# Patient Record
Sex: MALE | Race: WHITE | NOT HISPANIC OR LATINO | URBAN - METROPOLITAN AREA
[De-identification: names, ages, dates, MRNs, and addresses within clinical notes are randomized per-mention and may not be internally consistent; named-entity substitution may affect disease eponyms.]

---

## 2017-02-07 ENCOUNTER — HOSPITAL ENCOUNTER (OUTPATIENT)
Dept: CT IMAGING | Facility: CLINIC | Age: 82
Discharge: HOME | End: 2017-02-07
Attending: PREVENTIVE MEDICINE | Admitting: PREVENTIVE MEDICINE
Payer: MEDICARE

## 2017-02-07 DIAGNOSIS — Z00.6 RESEARCH EXAM: ICD-10-CM

## 2017-02-07 PROCEDURE — 71250 CT THORAX DX C-: CPT

## 2017-05-12 ENCOUNTER — RECORDS - HEALTHEAST (OUTPATIENT)
Dept: ADMINISTRATIVE | Facility: OTHER | Age: 82
End: 2017-05-12

## 2017-05-13 ENCOUNTER — HOSPITAL ENCOUNTER (OUTPATIENT)
Dept: CT IMAGING | Facility: HOSPITAL | Age: 82
Discharge: HOME | End: 2017-05-13
Attending: FAMILY MEDICINE

## 2017-05-13 DIAGNOSIS — S09.90XA CLOSED HEAD INJURY: ICD-10-CM

## 2017-05-13 ASSESSMENT — MIFFLIN-ST. JEOR: SCORE: 1518.96

## 2019-01-02 ENCOUNTER — HOSPITAL ENCOUNTER (OUTPATIENT)
Dept: ADMINISTRATIVE | Age: 84
Discharge: HOME | End: 2019-01-02

## 2019-03-22 ENCOUNTER — AMBULATORY - HEALTHEAST (OUTPATIENT)
Dept: PHYSICAL MEDICINE AND REHAB | Facility: CLINIC | Age: 84
End: 2019-03-22

## 2019-03-22 DIAGNOSIS — M48.061 SPINAL STENOSIS, LUMBAR REGION, WITHOUT NEUROGENIC CLAUDICATION: ICD-10-CM

## 2019-03-28 ENCOUNTER — HOSPITAL ENCOUNTER (OUTPATIENT)
Dept: PHYSICAL MEDICINE AND REHAB | Facility: CLINIC | Age: 84
Discharge: HOME | End: 2019-03-28
Attending: FAMILY MEDICINE

## 2019-03-28 DIAGNOSIS — M54.16 LEFT LUMBAR RADICULITIS: ICD-10-CM

## 2019-03-28 DIAGNOSIS — G89.29 CHRONIC BILATERAL LOW BACK PAIN WITH LEFT-SIDED SCIATICA: ICD-10-CM

## 2019-03-28 DIAGNOSIS — M54.42 CHRONIC BILATERAL LOW BACK PAIN WITH LEFT-SIDED SCIATICA: ICD-10-CM

## 2019-03-28 DIAGNOSIS — M51.369 DDD (DEGENERATIVE DISC DISEASE), LUMBAR: ICD-10-CM

## 2019-03-28 DIAGNOSIS — M48.061 LUMBAR FORAMINAL STENOSIS: ICD-10-CM

## 2019-03-28 DIAGNOSIS — M48.061 SPINAL STENOSIS OF LUMBAR REGION WITHOUT NEUROGENIC CLAUDICATION: ICD-10-CM

## 2019-03-28 RX ORDER — TAMSULOSIN HYDROCHLORIDE 0.4 MG/1
1 CAPSULE ORAL DAILY
Status: SHIPPED | COMMUNITY
Start: 2019-03-28

## 2019-03-28 RX ORDER — CHLORAL HYDRATE 500 MG
1 CAPSULE ORAL DAILY
Status: SHIPPED | COMMUNITY
Start: 2019-03-28

## 2019-03-28 RX ORDER — ERGOCALCIFEROL 1.25 MG/1
1 CAPSULE ORAL WEEKLY
Status: SHIPPED | COMMUNITY
Start: 2019-03-28

## 2019-03-28 RX ORDER — GLUCOSAMINE/CHONDROITIN/C/MANG 500-400 MG
2 CAPSULE ORAL DAILY
Status: SHIPPED | COMMUNITY
Start: 2019-03-28

## 2019-03-28 RX ORDER — DONEPEZIL HYDROCHLORIDE 10 MG/1
1 TABLET, FILM COATED ORAL 2 TIMES DAILY
Status: SHIPPED | COMMUNITY
Start: 2019-03-28

## 2019-03-29 ENCOUNTER — RECORDS - HEALTHEAST (OUTPATIENT)
Dept: ADMINISTRATIVE | Facility: OTHER | Age: 84
End: 2019-03-29

## 2019-04-03 ENCOUNTER — RECORDS - HEALTHEAST (OUTPATIENT)
Dept: ADMINISTRATIVE | Facility: OTHER | Age: 84
End: 2019-04-03

## 2019-04-11 ENCOUNTER — HOSPITAL ENCOUNTER (OUTPATIENT)
Dept: PHYSICAL MEDICINE AND REHAB | Facility: CLINIC | Age: 84
Discharge: HOME | End: 2019-04-11
Attending: PAIN MEDICINE

## 2019-04-11 DIAGNOSIS — M54.42 CHRONIC BILATERAL LOW BACK PAIN WITH LEFT-SIDED SCIATICA: ICD-10-CM

## 2019-04-11 DIAGNOSIS — G89.29 CHRONIC BILATERAL LOW BACK PAIN WITH LEFT-SIDED SCIATICA: ICD-10-CM

## 2019-04-11 DIAGNOSIS — M99.83 OTHER BIOMECHANICAL LESIONS OF LUMBAR REGION: ICD-10-CM

## 2019-04-11 DIAGNOSIS — M54.16 LEFT LUMBAR RADICULITIS: ICD-10-CM

## 2019-04-11 DIAGNOSIS — M48.061 LUMBAR FORAMINAL STENOSIS: ICD-10-CM

## 2019-04-25 ENCOUNTER — HOSPITAL ENCOUNTER (OUTPATIENT)
Dept: PHYSICAL MEDICINE AND REHAB | Facility: CLINIC | Age: 84
Discharge: HOME | End: 2019-04-25
Attending: NURSE PRACTITIONER

## 2019-04-25 DIAGNOSIS — G89.29 CHRONIC BILATERAL LOW BACK PAIN WITH LEFT-SIDED SCIATICA: ICD-10-CM

## 2019-04-25 DIAGNOSIS — M48.061 SPINAL STENOSIS OF LUMBAR REGION WITHOUT NEUROGENIC CLAUDICATION: ICD-10-CM

## 2019-04-25 DIAGNOSIS — M54.42 CHRONIC BILATERAL LOW BACK PAIN WITH LEFT-SIDED SCIATICA: ICD-10-CM

## 2019-04-25 DIAGNOSIS — M48.061 LUMBAR FORAMINAL STENOSIS: ICD-10-CM

## 2019-04-25 DIAGNOSIS — M54.16 LEFT LUMBAR RADICULITIS: ICD-10-CM

## 2019-04-25 DIAGNOSIS — M51.369 DDD (DEGENERATIVE DISC DISEASE), LUMBAR: ICD-10-CM

## 2019-05-15 ENCOUNTER — HOSPITAL ENCOUNTER (OUTPATIENT)
Dept: PHYSICAL MEDICINE AND REHAB | Facility: CLINIC | Age: 84
Discharge: HOME | End: 2019-05-15
Attending: PAIN MEDICINE

## 2019-05-15 DIAGNOSIS — M99.83 OTHER BIOMECHANICAL LESIONS OF LUMBAR REGION: ICD-10-CM

## 2019-05-15 DIAGNOSIS — M54.16 LEFT LUMBAR RADICULITIS: ICD-10-CM

## 2019-05-15 DIAGNOSIS — G89.29 CHRONIC BILATERAL LOW BACK PAIN WITH LEFT-SIDED SCIATICA: ICD-10-CM

## 2019-05-15 DIAGNOSIS — M54.42 CHRONIC BILATERAL LOW BACK PAIN WITH LEFT-SIDED SCIATICA: ICD-10-CM

## 2019-05-15 DIAGNOSIS — M48.061 LUMBAR FORAMINAL STENOSIS: ICD-10-CM

## 2019-05-29 ENCOUNTER — HOSPITAL ENCOUNTER (OUTPATIENT)
Dept: PHYSICAL MEDICINE AND REHAB | Facility: CLINIC | Age: 84
Discharge: HOME | End: 2019-05-29
Attending: NURSE PRACTITIONER

## 2019-05-29 DIAGNOSIS — M54.42 CHRONIC BILATERAL LOW BACK PAIN WITH LEFT-SIDED SCIATICA: ICD-10-CM

## 2019-05-29 DIAGNOSIS — M48.061 SPINAL STENOSIS OF LUMBAR REGION WITHOUT NEUROGENIC CLAUDICATION: ICD-10-CM

## 2019-05-29 DIAGNOSIS — G89.29 CHRONIC BILATERAL LOW BACK PAIN WITH LEFT-SIDED SCIATICA: ICD-10-CM

## 2019-05-29 DIAGNOSIS — M48.061 LUMBAR FORAMINAL STENOSIS: ICD-10-CM

## 2019-05-29 DIAGNOSIS — M54.16 LEFT LUMBAR RADICULITIS: ICD-10-CM

## 2019-05-29 DIAGNOSIS — M51.369 DDD (DEGENERATIVE DISC DISEASE), LUMBAR: ICD-10-CM

## 2019-07-22 ENCOUNTER — HOSPITAL ENCOUNTER (OUTPATIENT)
Dept: PHYSICAL MEDICINE AND REHAB | Facility: CLINIC | Age: 84
Discharge: HOME | End: 2019-07-22
Attending: PAIN MEDICINE

## 2019-07-22 DIAGNOSIS — M51.369 DDD (DEGENERATIVE DISC DISEASE), LUMBAR: ICD-10-CM

## 2019-07-22 DIAGNOSIS — M99.83 OTHER BIOMECHANICAL LESIONS OF LUMBAR REGION: ICD-10-CM

## 2019-07-22 DIAGNOSIS — M54.16 LEFT LUMBAR RADICULITIS: ICD-10-CM

## 2019-07-22 DIAGNOSIS — M48.061 LUMBAR FORAMINAL STENOSIS: ICD-10-CM

## 2019-07-22 DIAGNOSIS — M48.061 SPINAL STENOSIS OF LUMBAR REGION WITHOUT NEUROGENIC CLAUDICATION: ICD-10-CM

## 2019-07-22 RX ORDER — BUPRENORPHINE 5 UG/H
PATCH TRANSDERMAL
Refills: 0 | Status: SHIPPED | COMMUNITY
Start: 2019-07-08

## 2019-07-24 ENCOUNTER — HOSPITAL ENCOUNTER (OUTPATIENT)
Dept: RADIOLOGY | Facility: HOSPITAL | Age: 84
Discharge: HOME | End: 2019-07-24

## 2019-07-24 DIAGNOSIS — M99.83 OTHER BIOMECHANICAL LESIONS OF LUMBAR REGION: ICD-10-CM

## 2019-07-24 DIAGNOSIS — M48.061 SPINAL STENOSIS OF LUMBAR REGION WITHOUT NEUROGENIC CLAUDICATION: ICD-10-CM

## 2019-07-24 DIAGNOSIS — M48.061 LUMBAR FORAMINAL STENOSIS: ICD-10-CM

## 2019-07-24 DIAGNOSIS — M51.369 DDD (DEGENERATIVE DISC DISEASE), LUMBAR: ICD-10-CM

## 2019-07-24 DIAGNOSIS — M54.16 LEFT LUMBAR RADICULITIS: ICD-10-CM

## 2019-07-25 ENCOUNTER — COMMUNICATION - HEALTHEAST (OUTPATIENT)
Dept: PHYSICAL MEDICINE AND REHAB | Facility: CLINIC | Age: 84
End: 2019-07-25

## 2019-08-08 ENCOUNTER — HOSPITAL ENCOUNTER (OUTPATIENT)
Dept: MRI IMAGING | Facility: HOSPITAL | Age: 84
Discharge: HOME | End: 2019-08-08
Attending: PAIN MEDICINE

## 2019-08-08 LAB
CREAT BLD-MCNC: 0.8 MG/DL (ref 0.7–1.3)
GFR SERPL CREATININE-BSD FRML MDRD: >60 ML/MIN/1.73M2

## 2019-08-12 ENCOUNTER — COMMUNICATION - HEALTHEAST (OUTPATIENT)
Dept: PHYSICAL MEDICINE AND REHAB | Facility: CLINIC | Age: 84
End: 2019-08-12

## 2019-08-16 ENCOUNTER — HOSPITAL ENCOUNTER (OUTPATIENT)
Dept: PHYSICAL MEDICINE AND REHAB | Facility: CLINIC | Age: 84
Discharge: HOME | End: 2019-08-16
Attending: PAIN MEDICINE

## 2019-08-16 DIAGNOSIS — M54.16 LEFT LUMBAR RADICULITIS: ICD-10-CM

## 2019-08-16 DIAGNOSIS — M48.061 SPINAL STENOSIS OF LUMBAR REGION WITHOUT NEUROGENIC CLAUDICATION: ICD-10-CM

## 2019-08-16 DIAGNOSIS — G89.29 CHRONIC BILATERAL LOW BACK PAIN WITH LEFT-SIDED SCIATICA: ICD-10-CM

## 2019-08-16 DIAGNOSIS — M51.369 DDD (DEGENERATIVE DISC DISEASE), LUMBAR: ICD-10-CM

## 2019-08-16 DIAGNOSIS — M48.061 LUMBAR FORAMINAL STENOSIS: ICD-10-CM

## 2019-08-16 DIAGNOSIS — M54.42 CHRONIC BILATERAL LOW BACK PAIN WITH LEFT-SIDED SCIATICA: ICD-10-CM

## 2019-09-27 ENCOUNTER — HOSPITAL ENCOUNTER (OUTPATIENT)
Dept: PHYSICAL MEDICINE AND REHAB | Facility: CLINIC | Age: 84
Discharge: HOME | End: 2019-09-27
Attending: PAIN MEDICINE

## 2019-09-27 DIAGNOSIS — M54.16 LEFT LUMBAR RADICULITIS: ICD-10-CM

## 2019-09-27 DIAGNOSIS — M54.42 CHRONIC BILATERAL LOW BACK PAIN WITH LEFT-SIDED SCIATICA: ICD-10-CM

## 2019-09-27 DIAGNOSIS — G89.29 CHRONIC BILATERAL LOW BACK PAIN WITH LEFT-SIDED SCIATICA: ICD-10-CM

## 2019-09-27 DIAGNOSIS — M48.061 LUMBAR FORAMINAL STENOSIS: ICD-10-CM

## 2019-09-27 DIAGNOSIS — M51.369 DDD (DEGENERATIVE DISC DISEASE), LUMBAR: ICD-10-CM

## 2019-09-27 DIAGNOSIS — M48.061 SPINAL STENOSIS OF LUMBAR REGION WITHOUT NEUROGENIC CLAUDICATION: ICD-10-CM

## 2019-09-27 RX ORDER — MENTHOL AND METHYL SALICYLATE 10; 30 G/100G; G/100G
CREAM TOPICAL
Status: SHIPPED | COMMUNITY
Start: 2019-09-27

## 2019-10-09 ENCOUNTER — COMMUNICATION - HEALTHEAST (OUTPATIENT)
Dept: PHYSICAL MEDICINE AND REHAB | Facility: CLINIC | Age: 84
End: 2019-10-09

## 2019-10-09 DIAGNOSIS — M51.369 DDD (DEGENERATIVE DISC DISEASE), LUMBAR: ICD-10-CM

## 2019-10-09 DIAGNOSIS — M48.061 LUMBAR FORAMINAL STENOSIS: ICD-10-CM

## 2019-10-09 DIAGNOSIS — M54.16 LEFT LUMBAR RADICULITIS: ICD-10-CM

## 2019-10-09 DIAGNOSIS — M48.061 SPINAL STENOSIS OF LUMBAR REGION WITHOUT NEUROGENIC CLAUDICATION: ICD-10-CM

## 2019-10-23 ENCOUNTER — HOSPITAL ENCOUNTER (OUTPATIENT)
Dept: PALLIATIVE MEDICINE | Facility: OTHER | Age: 84
Discharge: HOME | End: 2019-10-23
Attending: PAIN MEDICINE

## 2019-10-23 DIAGNOSIS — G89.4 CHRONIC PAIN SYNDROME: ICD-10-CM

## 2019-10-23 DIAGNOSIS — G89.29 CHRONIC LEFT-SIDED LOW BACK PAIN WITH LEFT-SIDED SCIATICA: ICD-10-CM

## 2019-10-23 DIAGNOSIS — M54.42 CHRONIC LEFT-SIDED LOW BACK PAIN WITH LEFT-SIDED SCIATICA: ICD-10-CM

## 2019-12-02 ENCOUNTER — COMMUNICATION - HEALTHEAST (OUTPATIENT)
Dept: PHYSICAL MEDICINE AND REHAB | Facility: CLINIC | Age: 84
End: 2019-12-02

## 2019-12-02 DIAGNOSIS — M48.061 LUMBAR FORAMINAL STENOSIS: ICD-10-CM

## 2019-12-02 DIAGNOSIS — M48.061 SPINAL STENOSIS OF LUMBAR REGION WITHOUT NEUROGENIC CLAUDICATION: ICD-10-CM

## 2019-12-02 DIAGNOSIS — M54.16 LEFT LUMBAR RADICULITIS: ICD-10-CM

## 2019-12-02 DIAGNOSIS — M51.369 DDD (DEGENERATIVE DISC DISEASE), LUMBAR: ICD-10-CM

## 2019-12-18 ENCOUNTER — HOSPITAL ENCOUNTER (OUTPATIENT)
Dept: PALLIATIVE MEDICINE | Facility: OTHER | Age: 84
Discharge: HOME | End: 2019-12-18

## 2019-12-18 DIAGNOSIS — M54.42 CHRONIC LEFT-SIDED LOW BACK PAIN WITH LEFT-SIDED SCIATICA: ICD-10-CM

## 2019-12-18 DIAGNOSIS — G89.4 CHRONIC PAIN SYNDROME: ICD-10-CM

## 2019-12-18 DIAGNOSIS — G89.29 CHRONIC LEFT-SIDED LOW BACK PAIN WITH LEFT-SIDED SCIATICA: ICD-10-CM

## 2020-01-01 ENCOUNTER — HOSPITAL ENCOUNTER (OUTPATIENT)
Dept: PHYSICAL MEDICINE AND REHAB | Facility: CLINIC | Age: 85
Discharge: HOME | End: 2020-12-23
Attending: PAIN MEDICINE

## 2020-01-01 DIAGNOSIS — M51.369 DDD (DEGENERATIVE DISC DISEASE), LUMBAR: ICD-10-CM

## 2020-01-01 DIAGNOSIS — M48.061 SPINAL STENOSIS OF LUMBAR REGION WITHOUT NEUROGENIC CLAUDICATION: ICD-10-CM

## 2020-01-01 DIAGNOSIS — M48.061 LUMBAR FORAMINAL STENOSIS: ICD-10-CM

## 2020-01-01 DIAGNOSIS — M54.16 LEFT LUMBAR RADICULITIS: ICD-10-CM

## 2020-01-01 RX ORDER — GABAPENTIN 100 MG/1
CAPSULE ORAL
Qty: 270 CAPSULE | Refills: 2 | Status: SHIPPED | OUTPATIENT
Start: 2020-01-01

## 2020-01-02 ENCOUNTER — HOSPITAL ENCOUNTER (OUTPATIENT)
Dept: PALLIATIVE MEDICINE | Facility: OTHER | Age: 85
Discharge: HOME | End: 2020-01-02

## 2020-01-02 DIAGNOSIS — G89.4 CHRONIC PAIN SYNDROME: ICD-10-CM

## 2020-01-02 DIAGNOSIS — G89.29 CHRONIC LEFT-SIDED LOW BACK PAIN WITH LEFT-SIDED SCIATICA: ICD-10-CM

## 2020-01-02 DIAGNOSIS — M54.42 CHRONIC LEFT-SIDED LOW BACK PAIN WITH LEFT-SIDED SCIATICA: ICD-10-CM

## 2020-01-09 ENCOUNTER — HOSPITAL ENCOUNTER (OUTPATIENT)
Dept: PALLIATIVE MEDICINE | Facility: OTHER | Age: 85
Discharge: HOME | End: 2020-01-09

## 2020-01-09 DIAGNOSIS — G89.4 CHRONIC PAIN SYNDROME: ICD-10-CM

## 2020-01-09 DIAGNOSIS — G89.29 CHRONIC LEFT-SIDED LOW BACK PAIN WITH LEFT-SIDED SCIATICA: ICD-10-CM

## 2020-01-09 DIAGNOSIS — M54.42 CHRONIC LEFT-SIDED LOW BACK PAIN WITH LEFT-SIDED SCIATICA: ICD-10-CM

## 2020-01-16 ENCOUNTER — HOSPITAL ENCOUNTER (OUTPATIENT)
Dept: PALLIATIVE MEDICINE | Facility: OTHER | Age: 85
Discharge: HOME | End: 2020-01-16

## 2020-01-16 DIAGNOSIS — G89.29 CHRONIC LEFT-SIDED LOW BACK PAIN WITH LEFT-SIDED SCIATICA: ICD-10-CM

## 2020-01-16 DIAGNOSIS — G89.4 CHRONIC PAIN SYNDROME: ICD-10-CM

## 2020-01-16 DIAGNOSIS — M54.42 CHRONIC LEFT-SIDED LOW BACK PAIN WITH LEFT-SIDED SCIATICA: ICD-10-CM

## 2020-02-27 ENCOUNTER — COMMUNICATION - HEALTHEAST (OUTPATIENT)
Dept: PHYSICAL MEDICINE AND REHAB | Facility: CLINIC | Age: 85
End: 2020-02-27

## 2020-02-27 DIAGNOSIS — M54.16 LEFT LUMBAR RADICULITIS: ICD-10-CM

## 2020-02-27 DIAGNOSIS — M48.061 LUMBAR FORAMINAL STENOSIS: ICD-10-CM

## 2020-02-27 DIAGNOSIS — M48.061 SPINAL STENOSIS OF LUMBAR REGION WITHOUT NEUROGENIC CLAUDICATION: ICD-10-CM

## 2020-02-27 DIAGNOSIS — M51.369 DDD (DEGENERATIVE DISC DISEASE), LUMBAR: ICD-10-CM

## 2021-01-01 ENCOUNTER — RECORDS - HEALTHEAST (OUTPATIENT)
Dept: ADMINISTRATIVE | Facility: OTHER | Age: 86
End: 2021-01-01

## 2021-01-01 ENCOUNTER — RECORDS - HEALTHEAST (OUTPATIENT)
Dept: LAB | Facility: CLINIC | Age: 86
End: 2021-01-01

## 2021-01-01 ENCOUNTER — COMMUNICATION - HEALTHEAST (OUTPATIENT)
Dept: PHYSICAL MEDICINE AND REHAB | Facility: CLINIC | Age: 86
End: 2021-01-01

## 2021-01-01 ENCOUNTER — RECORDS - HEALTHEAST (OUTPATIENT)
Dept: ADMINISTRATIVE | Facility: CLINIC | Age: 86
End: 2021-01-01

## 2021-01-01 ENCOUNTER — DOCUMENTATION ONLY (OUTPATIENT)
Dept: OTHER | Facility: CLINIC | Age: 86
End: 2021-01-01

## 2021-01-01 ENCOUNTER — HOSPITAL ENCOUNTER (OUTPATIENT)
Dept: RADIOLOGY | Facility: HOSPITAL | Age: 86
Discharge: HOME | End: 2021-02-03
Attending: FAMILY MEDICINE

## 2021-01-01 VITALS — BODY MASS INDEX: 28.87 KG/M2 | WEIGHT: 207 LBS

## 2021-01-01 VITALS — BODY MASS INDEX: 26.6 KG/M2 | HEIGHT: 71 IN | WEIGHT: 190 LBS

## 2021-01-01 VITALS — BODY MASS INDEX: 28.45 KG/M2 | WEIGHT: 204 LBS

## 2021-01-01 VITALS — WEIGHT: 205 LBS | BODY MASS INDEX: 28.59 KG/M2

## 2021-01-01 VITALS — WEIGHT: 204 LBS | BODY MASS INDEX: 28.45 KG/M2

## 2021-01-01 VITALS — WEIGHT: 189.6 LBS | BODY MASS INDEX: 26.44 KG/M2

## 2021-01-01 VITALS — WEIGHT: 207 LBS | BODY MASS INDEX: 28.87 KG/M2

## 2021-01-01 VITALS — BODY MASS INDEX: 26.71 KG/M2 | WEIGHT: 191.5 LBS

## 2021-01-01 DIAGNOSIS — M54.16 LEFT LUMBAR RADICULITIS: ICD-10-CM

## 2021-01-01 DIAGNOSIS — R13.12 OROPHARYNGEAL DYSPHAGIA: ICD-10-CM

## 2021-01-01 LAB
ANION GAP SERPL CALCULATED.3IONS-SCNC: 7 MMOL/L (ref 5–18)
ANION GAP SERPL CALCULATED.3IONS-SCNC: 9 MMOL/L (ref 5–18)
BASOPHILS # BLD AUTO: 0.2 THOU/UL (ref 0–0.2)
BASOPHILS NFR BLD AUTO: 2 % (ref 0–2)
BUN SERPL-MCNC: 14 MG/DL (ref 8–28)
BUN SERPL-MCNC: 23 MG/DL (ref 8–28)
CALCIUM SERPL-MCNC: 8.2 MG/DL (ref 8.5–10.5)
CALCIUM SERPL-MCNC: 8.6 MG/DL (ref 8.5–10.5)
CHLORIDE BLD-SCNC: 106 MMOL/L (ref 98–107)
CHLORIDE BLD-SCNC: 108 MMOL/L (ref 98–107)
CO2 SERPL-SCNC: 28 MMOL/L (ref 22–31)
CO2 SERPL-SCNC: 30 MMOL/L (ref 22–31)
CREAT SERPL-MCNC: 0.77 MG/DL (ref 0.7–1.3)
CREAT SERPL-MCNC: 0.99 MG/DL (ref 0.7–1.3)
EOSINOPHIL # BLD AUTO: 0.3 THOU/UL (ref 0–0.4)
EOSINOPHIL NFR BLD AUTO: 3 % (ref 0–6)
ERYTHROCYTE [DISTWIDTH] IN BLOOD BY AUTOMATED COUNT: 13.6 % (ref 11–14.5)
ERYTHROCYTE [DISTWIDTH] IN BLOOD BY AUTOMATED COUNT: 13.8 % (ref 11–14.5)
GFR SERPL CREATININE-BSD FRML MDRD: >60 ML/MIN/1.73M2
GFR SERPL CREATININE-BSD FRML MDRD: >60 ML/MIN/1.73M2
GLUCOSE BLD-MCNC: 102 MG/DL (ref 70–125)
GLUCOSE BLD-MCNC: 82 MG/DL (ref 70–125)
HCT VFR BLD AUTO: 35.1 % (ref 40–54)
HCT VFR BLD AUTO: 41.6 % (ref 40–54)
HGB BLD-MCNC: 11.4 G/DL (ref 14–18)
HGB BLD-MCNC: 13.3 G/DL (ref 14–18)
IMM GRANULOCYTES # BLD: 0.2 THOU/UL
IMM GRANULOCYTES NFR BLD: 3 %
LYMPHOCYTES # BLD AUTO: 1.5 THOU/UL (ref 0.8–4.4)
LYMPHOCYTES NFR BLD AUTO: 18 % (ref 20–40)
MCH RBC QN AUTO: 30.4 PG (ref 27–34)
MCH RBC QN AUTO: 30.9 PG (ref 27–34)
MCHC RBC AUTO-ENTMCNC: 32 G/DL (ref 32–36)
MCHC RBC AUTO-ENTMCNC: 32.5 G/DL (ref 32–36)
MCV RBC AUTO: 95 FL (ref 80–100)
MCV RBC AUTO: 95 FL (ref 80–100)
MONOCYTES # BLD AUTO: 0.7 THOU/UL (ref 0–0.9)
MONOCYTES NFR BLD AUTO: 8 % (ref 2–10)
NEUTROPHILS # BLD AUTO: 5.8 THOU/UL (ref 2–7.7)
NEUTROPHILS NFR BLD AUTO: 67 % (ref 50–70)
PLATELET # BLD AUTO: 188 THOU/UL (ref 140–440)
PLATELET # BLD AUTO: 240 THOU/UL (ref 140–440)
PMV BLD AUTO: 10.3 FL (ref 8.5–12.5)
PMV BLD AUTO: 10.5 FL (ref 8.5–12.5)
POTASSIUM BLD-SCNC: 3.8 MMOL/L (ref 3.5–5)
POTASSIUM BLD-SCNC: 3.9 MMOL/L (ref 3.5–5)
RBC # BLD AUTO: 3.69 MILL/UL (ref 4.4–6.2)
RBC # BLD AUTO: 4.38 MILL/UL (ref 4.4–6.2)
SODIUM SERPL-SCNC: 143 MMOL/L (ref 136–145)
SODIUM SERPL-SCNC: 145 MMOL/L (ref 136–145)
WBC: 10.4 THOU/UL (ref 4–11)
WBC: 8.6 THOU/UL (ref 4–11)

## 2021-01-01 RX ORDER — GABAPENTIN 250 MG/5ML
SOLUTION ORAL
Qty: 18 ML | Refills: 0 | Status: SHIPPED | OUTPATIENT
Start: 2021-01-01

## 2021-05-27 NOTE — PATIENT INSTRUCTIONS - HE
Follow-up visit in 2 weeks with Swati Valencia CNP to discuss injection outcome and determine care plan going forward.       DISCHARGE INSTRUCTIONS    During office hours (8:00 a.m.- 4:30 p.m.) questions or concerns may be answered  by calling Spine Navigation Nurses at  [phone].     If you experience any problems after hours  please call [phone] and you will be connected to [facility] Connection.     All Patients:    ? You may experience an increase in your symptoms for the first 2 days (It may take anywhere between 2 days- 2 weeks for the steroid to have maximum effect).    ? You may use ice on the injection site, as frequently as 20 minutes each hour if needed.    ? You may take your pain medicine.    ? You may continue taking your regular medication after your injection. If you have had a Medial Branch Block you may resume pain medication once your pain diary is completed.    ? You may shower. No swimming, tub bath or hot tub for 48 hours.  You may remove your bandaid/bandage as soon as you are home.    ? You may resume light activities, as tolerated.    ? Resume your usual diet as tolerated.    ? It is strongly advised that you do not drive for 1-3 hours post injection.    ? If you have had oral sedation:  Do not drive for 8 hours post injection.      ? If you have had IV sedation:  Do not drive for 24 hours post injection.  Do not operate hazardous machinery or make important personal/business decisions for 24 hours.      POSSIBLE STEROID SIDE EFFECTS (If steroid/cortisone was used for your procedure)    -If you experience these symptoms, it should only last for a short period      Swelling of the legs                Skin redness (flushing)       Mouth (oral) irritation     Blood sugar (glucose) levels              Sweats                      Mood changes    Headache    Sleeplessness         POSSIBLE PROCEDURE SIDE EFFECTS  -Call the Spine Center if you are concerned    Increased Pain              Increased numbness/tingling        Nausea/Vomiting            Bruising/bleeding at site        Redness or swelling                                                Difficulty walking        Weakness             Fever greater than 100.5    *In the event of a severe headache after an epidural steroid injection that is relieved by lying down, please call the [facility] to speak with a clinical staff member*

## 2021-05-27 NOTE — PATIENT INSTRUCTIONS - HE
Discussed the importance of core strengthening, ROM, stretching exercises with the patient and how each of these entities is important in decreasing pain.  Explained to the patient that the purpose of physical therapy is to teach the patient a home exercise program.  These exercises need to be performed every day in order to decrease pain and prevent future occurrences of pain.        ~Please call Nurse Navigation line [phone] with any questions or concerns about your treatment plan, if symptoms worsen and you would like to be seen urgently, or if you have problems controlling bladder and bowel function.  ~Follow Up Appointment time slots with Swati Valencia CNP with the Spine Center, are also available at the [facility] location near [facility] on the first and third THURSDAY afternoons of each month.

## 2021-05-27 NOTE — PROGRESS NOTES
ASSESSMENT: Chris Fleming is a 89 y.o. male who presents for consultation at the request of [facility] PCP Prashant Marc MD, with a past medical history significant for Alzheimer's disease-lives with wife, hypertension, knee replacement 2009, BPH, ED, lumbar stenosis who presents today for new patient evaluation of:    -Chronic low back pain with most bothersome left lumbar radiculitis specific to L5 dermatomal pattern ongoing since November 2018 with minimal relief with physical therapy.  Patient does have multilevel central stenosis however no clear neurogenic claudication.  Also has multilevel foraminal stenosis with moderate chronic left foraminal stenosis at L5-S1.  There is also moderate foraminal stenosis at L4-5.    Patient is neurologically intact on exam. No myelopathic or red flag symptoms.     GRETA Score: 46    WHO 5: 9     Diagnoses and all orders for this visit:    Left lumbar radiculitis  -     Ambulatory referral to PT/OT  -     OPS TFESI Lumbar Sacral Unilateral    Chronic bilateral low back pain with left-sided sciatica  -     Ambulatory referral to PT/OT  -     OPS TFESI Lumbar Sacral Unilateral    Lumbar foraminal stenosis  -     Ambulatory referral to PT/OT  -     OPS TFESI Lumbar Sacral Unilateral    DDD (degenerative disc disease), lumbar  -     Ambulatory referral to PT/OT    Spinal stenosis of lumbar region without neurogenic claudication  -     Ambulatory referral to PT/OT      PLAN:  Reviewed spine anatomy and disease process. Discussed diagnosis and treatment options with the patient today. A shared decision making model was used.  The patient's values and choices were respected. The following represents what was discussed and decided upon by the provider and the patient.      -DIAGNOSTIC TESTS:  Images were personally reviewed and interpreted and explained to patient today using spine model.   --Lumbar spine MRI CDI shows central stenosis that is severe at L4-5 and L2-3.  Moderate to  severe at L3-4 and moderate at L5-S1.  Progression of L5-S1 since 2009 noted.  There is left paracentral disc herniation at L5-S1 with mild S1 nerve root compression that is new.  Multilevel hypertrophic facet and multilevel chronic foraminal stenosis progression at L4-5 and L5-S1 since 2009.    -PHYSICAL THERAPY: Referral also placed to [facility] physical therapy today where he is currently going to revisit core strengthening and nerve glides  Discussed the importance of core strengthening, ROM, stretching exercises with the patient and how each of these entities is important in decreasing pain.  Explained to the patient that the purpose of physical therapy is to teach the patient a home exercise program.  These exercises need to be performed every day in order to decrease pain and prevent future occurrences of pain.        -MEDICATIONS: Did discuss potentially trialing gabapentin down the road however given his age we will try injections first hoping that that will calm down symptoms enough that he does not need medication due to possible side effects with gabapentin and elderly.  Discussed multiple medication options today with patient. Discussed risks, side effects, and proper use of medications. Patient verbalized understanding.    -INTERVENTIONS: Ordered left L5-S1 transforaminal epidural steroid injection see if we get further relief of his left lumbar radiculitis L5 pattern.  Does have moderate foraminal stenosis on the left due to disc herniation as well as colonic foraminal stenosis.  If no benefit with this injection we could consider a left L5-S1 TF CADEN versus left L4-5 TF CADEN.  Discussed risks and benefits of injections with patient today.    -PATIENT EDUCATION:  45 minutes of total visit time was spent face to face with the patient today, greater than 50% of total time spent with patient was spent on counseling, education, and coordinating care.   -10 minutes spent outside of visit time, non-face-to-face  time, reviewing chart.    -FOLLOW-UP:   Follow-up for injection then 2 weeks postinjection.    Advised patient to call the Spine Center if symptoms worsen or you have problems controlling bladder and bowel function.   ______________________________________________________________________    SUBJECTIVE:  HPI:  Chris Fleming  Is a 89 y.o. male who presents today for new patient evaluation of low back pain that is chronic in nature however typically manageable, most bothersome symptoms since November 2018 as left leg pain specific to the lateral posterior thigh and lateral posterior calf with associated numbness and tingling.  Patient denies any right leg symptoms currently, denies weakness or recent trips or falls.  Currently his pain is constant even with sitting, back pain is there but not his biggest concern.  Patient denies bowel or bladder dysfunction.    Patient's wife who is primary care provider was present today during entire visit and added to past medical/surgical/family/social history and history of presenting illness.     Patient is retired [employer].    -Treatment to Date: No prior spinal surgery.  Physical therapy oversight x3 sessions recently with minimal benefit, some soreness with exercises.    L3-4 IL CADEN 5/20/2009 [facility] with benefit.    -Medications:  Aleve over-the-counter 2 tablets twice daily with benefit.    Current Outpatient Medications on File Prior to Encounter   Medication Sig Dispense Refill     ascorbate calcium (VITAMIN C ORAL) Take 1 tablet by mouth daily.       capsaicin (ZOSTRIX) 0.025 % cream Apply three times a day for knee pain       Colostrum/selen/gr t/Mush Cmb1 (IMMUNE TRIO ORAL) Take 250 mg by mouth daily.       donepezil (ARICEPT) 10 MG tablet Take 1 tablet by mouth 2 (two) times a day.       ergocalciferol (ERGOCALCIFEROL) 50,000 unit capsule Take 1 capsule by mouth once a week.       glucosamine-chondroit-vit C-Mn (GLUCOSAMINE 1500 COMPLEX) 500-400 mg cap Take 2 tablets  by mouth daily.       naproxen sodium (ALEVE) 220 MG tablet Take 2 tablets by mouth 2 (two) times a day.       omega-3 fatty acids (FISH OIL CONCENTRATE) 1,000 mg cap Take 1 capsule by mouth daily.       tamsulosin (FLOMAX) 0.4 mg cap Take 1 capsule by mouth daily.       No current facility-administered medications on file prior to encounter.        Allergies not on file    Past Medical History:   Diagnosis Date     Alzheimer's dementia      Hypertension         Past Surgical History:   Procedure Laterality Date     REPLACEMENT TOTAL KNEE         Family History   Problem Relation Age of Onset     Cancer Brother      Diabetes Brother      Hypertension Brother        Reviewed past medical, surgical, and family history with patient found on new patient intake packet located in EMR Media tab.     SOCIAL HX: Patient is  here with his wife who is his primary care provider.  Patient is retired [employer] officer.  Patient does moderate exercise with skiing and walking in the past but not so much recently.  Patient denies smoking/tobacco use, does report alcohol use caution wine but denies being a heavy drinker, denies recreational drug use.    ROS: Positive for back pain, leg pain, joint pain.  Specifically negative for bowel/bladder dysfunction, balance changes, headache, dizziness, foot drop, fevers, chills, appetite changes, nausea/vomiting, unexplained weight loss. Otherwise 13 systems reviewed are negative. Please see the patient's intake questionnaire from today for details.    OBJECTIVE:  /63 (Patient Site: Right Arm, Patient Position: Sitting)   Pulse 63   Wt 207 lb (93.9 kg)   SpO2 94%   BMI 28.87 kg/m      PHYSICAL EXAMINATION:    --CONSTITUTIONAL:  Vital signs as above.  No acute distress.  The patient is well nourished and well groomed.  --PSYCHIATRIC:  Appropriate mood and affect. The patient is awake, alert, oriented to person, place, time and answering questions appropriately with clear speech.     --SKIN:  Skin over the face, bilateral lower extremities, and posterior torso is clean, dry, intact without rashes.    --RESPIRATORY: Normal rhythm and effort. No abnormal accessory muscle breathing patterns noted.   --STANDING EXAMINATION:  Normal lumbar lordosis noted, no lateral shift.  --MUSCULOSKELETAL: Lumbar spine inspection reveals no evidence of deformity. Range of motion is not limited in lumbar flexion, extension, lateral rotation. No tenderness to palpation lumbar spine. Straight leg raising in the seated position is negative to radicular pain. Sciatic notch non-tender.  --SACROILIAC JOINT: One Finger point test negative.  --GROSS MOTOR: Gait is non-antalgic. Easily arises from a seated position.   --LOWER EXTREMITY MOTOR TESTING:  Plantar flexion left 5/5, right 5/5   Dorsiflexion left 5/5, right 5/5   Great toe MTP extension left 5/5, right 5/5  Knee flexion left 5/5, right 5/5  Knee extension left 5/5, right 5/5   Hip flexion left 5/5, right 5/5  Hip abduction left 5/5, right 5/5  Hip adduction left 5/5, right 5/5   --HIPS: Full range of motion bilaterally.   --NEUROLOGICAL:  2/4 patellar, medial hamstring, and achilles reflexes bilaterally.  Sensation to light touch is intact in the bilateral L4, L5, and S1 dermatomes. Babinski is negative. No clonus.  Negative Frankie reflex bilaterally.  --VASCULAR:  2/4 dorsalis pedis and posterior tibialsi pulses bilaterally.  Bilateral lower extremities are warm.  There is trace pitting edema of the bilateral lower extremities.    RESULTS: Prior medical records from entire clinic/care everywhere were reviewed today.    Imaging: Lumbar spine Imaging was personally reviewed and interpreted today. The images were shown to the patient and the findings were explained using a spine model.      CT STUDY OF THE LUMBAR SPINE  [facility] -3/26/2019  CLINICAL INFORMATION: Pain and weakness down the left leg for 2 years.  COMPARISON: MR lumbar 5/15/2009.  TECHNICAL INFORMATION:  2.5 mm thick axial images were obtained and sagittal and coronal reformatted images produced.  INTERPRETATION:   radiographs show advanced gaseous disc degeneration at multiple levels and arterial wall calcification. Axial images are negative for paraspinous soft tissue mass.  Upper Sacrum:  No fractures or mass lesions. Left accessory articulation between L5 transverse process and sacrum. Mild SI joint degenerative changes.  Lumbar:  L5-S1: Moderate gaseous disc degeneration, calcified disc protrusion compresses dural sac with gas-containing disc protrusion and left ventral epidural space, moderate central stenosis and S1 root impingement. Moderate chronic left and mild right foraminal stenosis.  L4-5: Moderate gaseous disc degeneration with 2 mm spondylolisthesis, severe central and subarticular stenosis, hypertrophic facet arthropathy and calcified bilateral synovial cysts/ligamentum flavum. Foraminal stenosis is severe on the right and moderate on the left.  L3-4: Mild disc desiccation, moderate to severe central stenosis, hypertrophic facet degeneration and mild foraminal stenosis.  L2-3: Severe gaseous disc space narrowing, severe central stenosis due to marginal osteophyte and facet/ligamentum flavum hypertrophy and chronic mild to moderate left/severe right foraminal stenosis.  L1-2: Severe gaseous disc degeneration, mild to moderate central stenosis and mild foraminal stenosis. No significant facet joint degeneration.  T12-L1: Fused anterior osteophytes and disc margin, no significant central stenosis and foramina appear patent.  CONCLUSION: Multilevel spondylosis and vacuum disc degeneration, no acute fractures and significant findings as follows:  1. Central spinal stenosis is severe at L4-5 and L2-3, moderate to severe at L3-4, moderate at L5-S1 and mild to moderate at L1-2. Progression of central stenosis at L5-S1 since 2009.  2. Left paracentral gas-containing disc herniation at L5-S1 with overall  mild S1 impingement is new since comparison.  3. Multilevel chronic hypertrophic facet arthropathy and multilevel chronic foraminal stenosis with progression of stenosis at L4-5 and L5-S1 since 2009.

## 2021-05-28 NOTE — PATIENT INSTRUCTIONS - HE
Follow-up visit in 2 weeks with Swati Valencia CNP to discuss injection outcome and determine care plan going forward.       DISCHARGE INSTRUCTIONS    During office hours (8:00 a.m.- 4:30 p.m.) questions or concerns may be answered  by calling Spine Navigation Nurses at  [phone].     If you experience any problems after hours  please call [phone] and you will be connected to [facility].     All Patients:    ? You may experience an increase in your symptoms for the first 2 days (It may take anywhere between 2 days- 2 weeks for the steroid to have maximum effect).    ? You may use ice on the injection site, as frequently as 20 minutes each hour if needed.    ? You may take your pain medicine.    ? You may continue taking your regular medication after your injection. If you have had a Medial Branch Block you may resume pain medication once your pain diary is completed.    ? You may shower. No swimming, tub bath or hot tub for 48 hours.  You may remove your bandaid/bandage as soon as you are home.    ? You may resume light activities, as tolerated.    ? Resume your usual diet as tolerated.    ? It is strongly advised that you do not drive for 1-3 hours post injection.        POSSIBLE STEROID SIDE EFFECTS (If steroid/cortisone was used for your procedure)    -If you experience these symptoms, it should only last for a short period      Swelling of the legs                Skin redness (flushing)       Mouth (oral) irritation     Blood sugar (glucose) levels              Sweats                      Mood changes    Headache    Sleeplessness         POSSIBLE PROCEDURE SIDE EFFECTS  -Call the Spine Center if you are concerned    Increased Pain             Increased numbness/tingling        Nausea/Vomiting            Bruising/bleeding at site        Redness or swelling                                                Difficulty walking        Weakness             Fever greater than 100.5    *In the  event of a severe headache after an epidural steroid injection that is relieved by lying down, please call the [facility] to speak with a clinical staff member*

## 2021-05-28 NOTE — PROGRESS NOTES
Assessment:     Diagnoses and all orders for this visit:    Chronic bilateral low back pain with left-sided sciatica  -     OPS TFESI Lumbar Sacral Unilateral; Future; Expected date: 04/25/2019    Left lumbar radiculitis  -     OPS TFESI Lumbar Sacral Unilateral; Future; Expected date: 04/25/2019    Lumbar foraminal stenosis  -     OPS TFESI Lumbar Sacral Unilateral; Future; Expected date: 04/25/2019    DDD (degenerative disc disease), lumbar    Spinal stenosis of lumbar region without neurogenic claudication       Chris Fleming is a 89 y.o. y.o. male with past medical history significant for Alzheimer's disease-lives with wife, hypertension, knee replacement 2009, BPH, ED, lumbar stenosis who presents today for follow-up regarding:    -Chronic low back pain with no significant left lumbar radiculitis L5 dermatomal pattern since November 2018 with 100% relief with left L5-S1 TFESI x1 day only and then 20% relief thereafter.  Lumbar MRI shows multilevel foraminal stenosis moderate on the left at L5-S1 but also at L4-5.    Patient is neurologically intact on exam.     Plan:     A shared decision making plan was used. The patient's values and choices were respected. Prior medical records from 3/28/19 were reviewed today. The following represents what was discussed and decided upon by the provider and the patient.        -DIAGNOSTIC TESTS: Images were personally reviewed and interpreted.   --Lumbar spine MRI CDI shows central stenosis that is severe at L4-5 and L2-3.  Moderate to severe at L3-4 and moderate at L5-S1.  Progression of L5-S1 since 2009 noted.  There is left paracentral disc herniation at L5-S1 with mild S1 nerve root compression that is new.  Multilevel hypertrophic facet and multilevel chronic foraminal stenosis progression at L4-5 and L5-S1 since 2009.    -INTERVENTIONS: Ordered left L4-5 transforaminal epidural steroid injection see if we get further relief of his left lumbar radiculitis that he does  also have moderate foraminal stenosis at the L4-5 level.  He got minimal lasting benefit from left L5-S1 TFESI.  --Did discuss with patient and his wife today that if this injection provides minimal benefit, we could consider a spinal cord stimulator, he is not wishing to pursue spinal surgery otherwise.  They are hoping to avoid spinal cord stimulator but would be open to the option if his pain does not improve.    -MEDICATIONS: No change in medications today.  -We did discuss potentially trialing gabapentin however this is not a good medication due to the possible side effects with his age, him and his wife and her agreement and prefer to hold off on further medication.  Discussed side effects of medications and proper use. Patient verbalized understanding.    -PHYSICAL THERAPY: Advised patient to continue physical therapy as tolerated at this well, he does have further scheduled appointments.  Discussed the importance of core strengthening, ROM, stretching exercises with the patient and how each of these entities is important in decreasing pain.  Explained to the patient that the purpose of physical therapy is to teach the patient a home exercise program.  These exercises need to be performed every day in order to decrease pain and prevent future occurrences of pain.        -PATIENT EDUCATION:  20 minutes of total visit time was spent face to face with the patient today, 60 % of the visit was spent on counseling, education, and coordinating care.   -5 minutes spent outside of visit time, non-face-to-face time, reviewing chart.    -FOLLOW UP: Follow-up for injection with Dr. Liu in 2 weeks postinjection.  Advised to contact clinic if symptoms worsen or change.    Subjective:     Chris Fleming is a 89 y.o. male who presents today for follow-up regarding ongoing low back pain and left lumbar radicular pain into the lateral thigh and lateral shin with associated numbness and tingling in which she did receive  significant x1 day only from left L5-S1 TF CADEN x1 day only and then pain about 20% relief thereafter.  Currently his pain is slightly better at 4/10 still gets up to 10/10 at its worse throughout the day randomly, a 3 at its best.  Patient reports very minimal pain on the right leg and his left leg is most bothersome even though he does also have ongoing chronic low back pain that is more manageable.    Patient is retired [employer].     -Treatment to Date: No prior spinal surgery.  Physical therapy oversight x3 sessions recently with minimal benefit, some soreness with exercises.     L3-4 IL CADEN 5/20/2009 CDI with benefit.  Left L5-S1 TFESI 4/11/2019 with 100% relief x1 day, then 20% thereafter.  Preprocedure pain 9/10, pulse 4/10.     -Medications:  Aleve over-the-counter 2 tablets twice daily with benefit.    Current Outpatient Medications on File Prior to Encounter   Medication Sig Dispense Refill     ascorbate calcium (VITAMIN C ORAL) Take 1 tablet by mouth daily.       capsaicin (ZOSTRIX) 0.025 % cream Apply three times a day for knee pain       Colostrum/selen/gr t/Mush Cmb1 (IMMUNE TRIO ORAL) Take 250 mg by mouth daily.       donepezil (ARICEPT) 10 MG tablet Take 1 tablet by mouth 2 (two) times a day.       ergocalciferol (ERGOCALCIFEROL) 50,000 unit capsule Take 1 capsule by mouth once a week.       glucosamine-chondroit-vit C-Mn (GLUCOSAMINE 1500 COMPLEX) 500-400 mg cap Take 2 tablets by mouth daily.       naproxen sodium (ALEVE) 220 MG tablet Take 2 tablets by mouth 2 (two) times a day.       omega-3 fatty acids (FISH OIL CONCENTRATE) 1,000 mg cap Take 1 capsule by mouth daily.       tamsulosin (FLOMAX) 0.4 mg cap Take 1 capsule by mouth daily.       No current facility-administered medications on file prior to encounter.        Allergies   Allergen Reactions     Memantine Unknown       Past Medical History:   Diagnosis Date     Alzheimer's dementia      Hypertension         Review of Systems  ROS:   Specifically negative for bowel/bladder dysfunction, balance changes, headache, dizziness, foot drop, fevers, chills, appetite changes, nausea/vomiting, unexplained weight loss. Otherwise 13 systems reviewed are negative. Please see the patient's intake questionnaire from today for details.    Reviewed Social, Family, Past Medical and Past Surgical history with patient, no significant changes noted since prior visit.     Objective:     /64 (Patient Site: Right Arm, Patient Position: Sitting)   Pulse 68   Wt 205 lb (93 kg)   BMI 28.59 kg/m      PHYSICAL EXAMINATION:    --CONSTITUTIONAL: Well developed, well nourished, healthy appearing individual.  --PSYCHIATRIC: Appropriate mood and affect. No difficulty interacting due to temper, social withdrawal, or memory issues.  --SKIN: Lumbar region is dry and intact.   --RESPIRATORY: Normal rhythm and effort. No abnormal accessory muscle breathing patterns noted.   --MUSCULOSKELETAL:  Normal lumbar lordosis noted, no lateral shift.  --LUMBAR SPINE:  Inspection reveals no evidence of deformity.   --LOWER EXTREMITY MOTOR TESTING:  Plantar flexion left 5/5, right 5/5   Dorsiflexion left 5/5, right 5/5   Great toe MTP extension left 5/5, right 5/5  Knee flexion left 5/5, right 5/5  Knee extension left 5/5, right 5/5   Hip flexion left 5/5, right 5/5  Hip abduction left 5/5, right 5/5  Hip adduction left 5/5, right 5/5   --HIPS: Full range of motion bilaterally.   --NEUROLOGIC: Bilateral patellar and achilles reflexes are physiologic and symmetric. Sensation to light touch is intact in the bilateral L4, L5, and S1 dermatomes.    RESULTS:   Imaging: Lumbar spine imaging was reviewed today. The images were shown to the patient and the findings were explained using a spine model.      CT STUDY OF THE LUMBAR SPINE  CDI -3/26/2019  CLINICAL INFORMATION: Pain and weakness down the left leg for 2 years.  COMPARISON: MR lumbar 5/15/2009.  TECHNICAL INFORMATION: 2.5 mm thick  axial images were obtained and sagittal and coronal reformatted images produced.  INTERPRETATION:   radiographs show advanced gaseous disc degeneration at multiple levels and arterial wall calcification. Axial images are negative for paraspinous soft tissue mass.  Upper Sacrum:  No fractures or mass lesions. Left accessory articulation between L5 transverse process and sacrum. Mild SI joint degenerative changes.  Lumbar:  L5-S1: Moderate gaseous disc degeneration, calcified disc protrusion compresses dural sac with gas-containing disc protrusion and left ventral epidural space, moderate central stenosis and S1 root impingement. Moderate chronic left and mild right foraminal stenosis.  L4-5: Moderate gaseous disc degeneration with 2 mm spondylolisthesis, severe central and subarticular stenosis, hypertrophic facet arthropathy and calcified bilateral synovial cysts/ligamentum flavum. Foraminal stenosis is severe on the right and moderate on the left.  L3-4: Mild disc desiccation, moderate to severe central stenosis, hypertrophic facet degeneration and mild foraminal stenosis.  L2-3: Severe gaseous disc space narrowing, severe central stenosis due to marginal osteophyte and facet/ligamentum flavum hypertrophy and chronic mild to moderate left/severe right foraminal stenosis.  L1-2: Severe gaseous disc degeneration, mild to moderate central stenosis and mild foraminal stenosis. No significant facet joint degeneration.  T12-L1: Fused anterior osteophytes and disc margin, no significant central stenosis and foramina appear patent.  CONCLUSION: Multilevel spondylosis and vacuum disc degeneration, no acute fractures and significant findings as follows:  1. Central spinal stenosis is severe at L4-5 and L2-3, moderate to severe at L3-4, moderate at L5-S1 and mild to moderate at L1-2. Progression of central stenosis at L5-S1 since 2009.  2. Left paracentral gas-containing disc herniation at L5-S1 with overall mild S1  impingement is new since comparison.  3. Multilevel chronic hypertrophic facet arthropathy and multilevel chronic foraminal stenosis with progression of stenosis at L4-5 and L5-S1 since 2009.

## 2021-05-29 NOTE — PATIENT INSTRUCTIONS - HE
Discussed the importance of core strengthening, ROM, stretching exercises with the patient and how each of these entities is important in decreasing pain.  Explained to the patient that the purpose of physical therapy is to teach the patient a home exercise program.  These exercises need to be performed every day in order to decrease pain and prevent future occurrences of pain.        ~Please call Nurse Navigation line [phone] with any questions or concerns about your treatment plan, if symptoms worsen and you would like to be seen urgently, or if you have problems controlling bladder and bowel function.  ~Follow Up Appointment time slots with Swati Valencia CNP with [facility], are also available at the [facility] location near [facility] on the 1st and 3rd THURSDAY afternoons of each month.

## 2021-05-29 NOTE — PROGRESS NOTES
Assessment:     Diagnoses and all orders for this visit:    Chronic bilateral low back pain with left-sided sciatica    Left lumbar radiculitis    Lumbar foraminal stenosis    DDD (degenerative disc disease), lumbar    Spinal stenosis of lumbar region without neurogenic claudication       Chris Fleming is a 89 y.o. y.o. male with past medical history significant for Alzheimer's disease-lives with wife, hypertension, knee replacement 2009, BPH, ED, lumbar stenosis who presents today for follow-up regarding:    -Chronic low back pain with most significant left lumbar radiculitis L5 dermatomal pattern with short-term relief only with left L5-S1 TFESI minimal to no relief with left L4-5 TFESI.  MRI does show multilevel foraminal stenosis moderate on the left L5-S1 and L4-5.    Patient is neurologically intact on exam, he is not interested in surgery at this time and is not a great surgical candidate.    GRETA Score: 56     Plan:     A shared decision making plan was used. The patient's values and choices were respected. Prior medical records from 4/25/19 were reviewed today. The following represents what was discussed and decided upon by the provider and the patient.        -DIAGNOSTIC TESTS: Images were personally reviewed and interpreted.   --Lumbar spine MRI CDI shows central stenosis that is severe at L4-5 and L2-3.  Moderate to severe at L3-4 and moderate at L5-S1.  Progression of L5-S1 since 2009 noted.  There is left paracentral disc herniation at L5-S1 with mild S1 nerve root compression that is new.  Multilevel hypertrophic facet and multilevel chronic foraminal stenosis progression at L4-5 and L5-S1 since 2009.    -INTERVENTIONS: We did discuss the possibility of an L5-S1 interlaminar epidural steroid injection however it is uncertain if this would give him substantial benefit.  Currently they prefer to hold off on further steroid injections.  We did discuss the option of spinal cord stimulator which we may be  a good candidate for, they are going to consider this and we did give them printed information as well as a DVD today about the spinal cord stimulator process and they will contact us if they want to move forward with consultation with Dr. Liu to further discuss this option.    -MEDICATIONS: No changes in medications today.  He is not a great candidate for gabapentin medications given his age and he and his wife are both in agreement we will do not trial further medications at this time.  Discussed side effects of medications and proper use. Patient verbalized understanding.    -PHYSICAL THERAPY: Advised patient to continue with prior physical therapy home exercises as well at this time.  Discussed the importance of core strengthening, ROM, stretching exercises with the patient and how each of these entities is important in decreasing pain.  Explained to the patient that the purpose of physical therapy is to teach the patient a home exercise program.  These exercises need to be performed every day in order to decrease pain and prevent future occurrences of pain.        -PATIENT EDUCATION:  25 minutes of total visit time was spent face to face with the patient today, 60 % of the visit was spent on counseling, education, and coordinating care.   -5 minutes spent outside of visit time, non-face-to-face time, reviewing chart.    -FOLLOW UP: Follow-up as needed with Dr. Liu for spinal cord stimulator consult if this is something they would like to move forward with.  Advised to contact clinic if symptoms worsen or change.    Subjective:     Chris Fleming is a 89 y.o. male who presents today for follow-up regarding ongoing chronic low back pain left lumbosacral junction that radiates to left posterior buttock posterior lateral thigh posterior lateral calf that is constant in which she got short-term relief only with left L5-S1 TFESI and minimal to no lasting benefit with left L4-5 TFESI.  Currently he  reports his pain is an 8/10, a 2 to its best however pain is typically bothersome with any type of activity, doing any type of lifting or walking or moving too quickly, does improve slightly with Aleve.  Patient denies any recent trips or falls or balance changes, denies bowel or bladder loss control.    *Patient is retired [employer].  Patient's wife was present today during entire visit and added to past medical/surgical/family/social history and history of presenting illness.      -Treatment to Date: No prior spinal surgery.  Physical therapy [facility]SI x 4 sessions 4/2019 recently with minimal benefit, some soreness with exercises.     L3-4 IL CADEN 5/20/2009 CDI with benefit.  Left L5-S1 TFESI 4/11/2019 with 100% relief x1 day, then 20% thereafter.  Preprocedure pain 9/10, pulse 4/10.  Left L4-5 TF CADEN 5/15/2019 with minimal to no lasting benefit.  Preprocedure pain 5/10, post 5/10.     -Medications:  Aleve over-the-counter 2 tablets twice daily with benefit.    Current Outpatient Medications on File Prior to Encounter   Medication Sig Dispense Refill     naproxen sodium (ALEVE) 220 MG tablet Take 2 tablets by mouth 2 (two) times a day.       ascorbate calcium (VITAMIN C ORAL) Take 1 tablet by mouth daily.       capsaicin (ZOSTRIX) 0.025 % cream Apply three times a day for knee pain       Colostrum/selen/gr t/Mush Cmb1 (IMMUNE TRIO ORAL) Take 250 mg by mouth daily.       donepezil (ARICEPT) 10 MG tablet Take 1 tablet by mouth 2 (two) times a day.       ergocalciferol (ERGOCALCIFEROL) 50,000 unit capsule Take 1 capsule by mouth once a week.       glucosamine-chondroit-vit C-Mn (GLUCOSAMINE 1500 COMPLEX) 500-400 mg cap Take 2 tablets by mouth daily.       omega-3 fatty acids (FISH OIL CONCENTRATE) 1,000 mg cap Take 1 capsule by mouth daily.       tamsulosin (FLOMAX) 0.4 mg cap Take 1 capsule by mouth daily.       No current facility-administered medications on file prior to encounter.        Allergies   Allergen  Reactions     Memantine Unknown       Past Medical History:   Diagnosis Date     Alzheimer's dementia      Hypertension         Review of Systems  ROS:  Specifically negative for bowel/bladder dysfunction, balance changes, headache, dizziness, foot drop, fevers, chills, appetite changes, nausea/vomiting, unexplained weight loss. Otherwise 13 systems reviewed are negative. Please see the patient's intake questionnaire from today for details.    Reviewed Social, Family, Past Medical and Past Surgical history with patient, no significant changes noted since prior visit.     Objective:     /82 (Patient Site: Right Arm, Patient Position: Sitting)   Pulse 73   Wt 204 lb (92.5 kg)   SpO2 95%   BMI 28.45 kg/m      PHYSICAL EXAMINATION:    --CONSTITUTIONAL: Well developed, well nourished, healthy appearing individual.  --PSYCHIATRIC: Appropriate mood and affect. No difficulty interacting due to temper, social withdrawal, or memory issues.  --SKIN: Lumbar region is dry and intact.   --RESPIRATORY: Normal rhythm and effort. No abnormal accessory muscle breathing patterns noted.   --MUSCULOSKELETAL:  Normal lumbar lordosis noted, no lateral shift.  --GROSS MOTOR: Easily arises from a seated position. Gait is non-antalgic  --LUMBAR SPINE:  Inspection reveals no evidence of deformity.   --LOWER EXTREMITY MOTOR TESTING:  Plantar flexion left 5/5, right 5/5   Dorsiflexion left 5/5, right 5/5   Great toe MTP extension left 5/5, right 5/5  Knee flexion left 5/5, right 5/5  Knee extension left 5/5, right 5/5   Hip flexion left 5/5, right 5/5  Hip abduction left 5/5, right 5/5  Hip adduction left 5/5, right 5/5   --HIPS: Full range of motion bilaterally.   --NEUROLOGIC: Bilateral patellar and achilles reflexes are physiologic and symmetric. Sensation to light touch is intact in the bilateral L4, L5, and S1 dermatomes.    RESULTS:   Imaging: Lumbar spine imaging was reviewed today. The images were shown to the patient and the  findings were explained using a spine model.      CT STUDY OF THE LUMBAR SPINE  CDI -3/26/2019  CLINICAL INFORMATION: Pain and weakness down the left leg for 2 years.  COMPARISON: MR lumbar 5/15/2009.  TECHNICAL INFORMATION: 2.5 mm thick axial images were obtained and sagittal and coronal reformatted images produced.  INTERPRETATION:   radiographs show advanced gaseous disc degeneration at multiple levels and arterial wall calcification. Axial images are negative for paraspinous soft tissue mass.  Upper Sacrum:  No fractures or mass lesions. Left accessory articulation between L5 transverse process and sacrum. Mild SI joint degenerative changes.  Lumbar:  L5-S1: Moderate gaseous disc degeneration, calcified disc protrusion compresses dural sac with gas-containing disc protrusion and left ventral epidural space, moderate central stenosis and S1 root impingement. Moderate chronic left and mild right foraminal stenosis.  L4-5: Moderate gaseous disc degeneration with 2 mm spondylolisthesis, severe central and subarticular stenosis, hypertrophic facet arthropathy and calcified bilateral synovial cysts/ligamentum flavum. Foraminal stenosis is severe on the right and moderate on the left.  L3-4: Mild disc desiccation, moderate to severe central stenosis, hypertrophic facet degeneration and mild foraminal stenosis.  L2-3: Severe gaseous disc space narrowing, severe central stenosis due to marginal osteophyte and facet/ligamentum flavum hypertrophy and chronic mild to moderate left/severe right foraminal stenosis.  L1-2: Severe gaseous disc degeneration, mild to moderate central stenosis and mild foraminal stenosis. No significant facet joint degeneration.  T12-L1: Fused anterior osteophytes and disc margin, no significant central stenosis and foramina appear patent.  CONCLUSION: Multilevel spondylosis and vacuum disc degeneration, no acute fractures and significant findings as follows:  1. Central spinal stenosis is  severe at L4-5 and L2-3, moderate to severe at L3-4, moderate at L5-S1 and mild to moderate at L1-2. Progression of central stenosis at L5-S1 since 2009.  2. Left paracentral gas-containing disc herniation at L5-S1 with overall mild S1 impingement is new since comparison.  3. Multilevel chronic hypertrophic facet arthropathy and multilevel chronic foraminal stenosis with progression of stenosis at L4-5 and L5-S1 since 2009.

## 2021-05-30 NOTE — TELEPHONE ENCOUNTER
"Phone call to patient to discuss the x-ray findings and treatment plan. Patient's wife gave phone to patient to give telephone consent to discuss with wife. \"I have Alzheimer's. Anything you tell me won't be remembered. It won't do you any good to tell me. Tell her.\" Results and treatment plan discussed with Nan. Stated understanding and appreciation for call.   "

## 2021-05-30 NOTE — TELEPHONE ENCOUNTER
----- Message from Javy Liu, DO sent at 7/25/2019 10:04 AM CDT -----  Please call the patient let him know I reviewed his x-ray of his left hip.  It does show me a minimal amount of arthritis in the left hip.  This is likely not the area of pain.  Please continue with increasing gabapentin and following up as scheduled.

## 2021-05-30 NOTE — PATIENT INSTRUCTIONS - HE
Prescribed Gabapentin today, 100 mg tablets, to be titrated up to 3 tablets 3 times a day as tolerated for your nerve pain. Please follow Gabapentin dosing chart below.    Gabapentin 100mg Dosing Chart    DATE  MORNING AFTERNOON BEDTIME    Day 1 0 0 1    Day 2 0 0 1    Day 3 0 0 1    Day 4 1 0 1    Day 5 1 0 1    Day 6 1 0 1    Day 7 1 1 1    Day 8 1 1 1    Day 9 1 1 1    Day 10 1 1 2    Day 11 1 1 2    Day 12 1 1 2    Day 13 2 1 2    Day 14 2 1 2    Day 15 2 1 2    Day 16 2 2 2    Day 17 2 2 2    Day 18 2 2 2    Day 19 2 2 3    Day 20 2 2 3    Day 21 2 2 3    Day 22 3 2 3    Day 23 3 2 3    Day 24 3 2 3    Day 25 3 3 3    Day 26 3 3 3    Day 27 3 3 3     Continue medication, taking 3 capsules three times daily    Please call if you have any questions regarding how to take your medication  Clinic Phone # [phone]      I have ordered an x-ray of your left hip joint.  Once I reviewed the images I will have 1 of our nurses give you call with results and recommendations.    ~Please call Nurse Navigation line [phone] with any questions or concerns about your treatment plan, if symptoms worsen and you would like to be seen urgently, or if you have problems controlling bladder and bowel function.

## 2021-05-30 NOTE — PROGRESS NOTES
Assessment:     Diagnoses and all orders for this visit:    Left lumbar radiculitis  -     gabapentin (NEURONTIN) 100 MG capsule; Take 100 mg by mouth 3 (three) times a day. Increase as instructed to 3 times a day.  Dispense: 270 capsule; Refill: 1  -     XR Pelvis W 2 Vw Hip Left; Future; Expected date: 07/22/2019  -     MR Thoracic Spine With Without Contrast    Lumbar foraminal stenosis  -     gabapentin (NEURONTIN) 100 MG capsule; Take 100 mg by mouth 3 (three) times a day. Increase as instructed to 3 times a day.  Dispense: 270 capsule; Refill: 1  -     XR Pelvis W 2 Vw Hip Left; Future; Expected date: 07/22/2019  -     MR Thoracic Spine With Without Contrast    DDD (degenerative disc disease), lumbar  -     gabapentin (NEURONTIN) 100 MG capsule; Take 100 mg by mouth 3 (three) times a day. Increase as instructed to 3 times a day.  Dispense: 270 capsule; Refill: 1  -     XR Pelvis W 2 Vw Hip Left; Future; Expected date: 07/22/2019  -     MR Thoracic Spine With Without Contrast    Spinal stenosis of lumbar region without neurogenic claudication  -     gabapentin (NEURONTIN) 100 MG capsule; Take 100 mg by mouth 3 (three) times a day. Increase as instructed to 3 times a day.  Dispense: 270 capsule; Refill: 1  -     XR Pelvis W 2 Vw Hip Left; Future; Expected date: 07/22/2019  -     MR Thoracic Spine With Without Contrast       Chris Fleming is a 89 y.o. y.o. male with past medical history significant for Alzheimer's disease-lives with wife, hypertension, knee replacement 2009, BPH, ED, lumbar stenosis who presents today for follow-up regarding discussion for spinal cord stimulator trial for low back and left lower limb pain:    -Overall patient's physical exam is reassuring he has normal strength and reflexes.  I like to get hip x-rays of his left hip as some provocative maneuvers are positive for hip joint.  Likely that pain is radicular in nature.     Plan:     A shared decision making plan was used. The  patient's values and choices were respected. Prior medical records from Swati Valencia's last visit on 5/29/2019 were reviewed today. The following represents what was discussed and decided upon by the provider and the patient.        -DIAGNOSTIC TESTS: Images were personally reviewed and interpreted.   --Lumbar spine MRI CDI shows central stenosis that is severe at L4-5 and L2-3.  Moderate to severe at L3-4 and moderate at L5-S1.  Progression of L5-S1 since 2009 noted.  There is left paracentral disc herniation at L5-S1 with mild S1 nerve root compression that is new.  Multilevel hypertrophic facet and multilevel chronic foraminal stenosis progression at L4-5 and L5-S1 since 2009.  --I have ordered an x-ray of his left hip.  Once I reviewed images off 1 of our nurses give him a call with results and recommendations.  --I also ordered an MRI of his thoracic spine and we will possibly be moving forward with a spinal cord stimulator trial.    -INTERVENTIONS: No interventions at this time.  We discussed spinal cord stimulator trial.  At this time we will hold off as we are looking at medication management and other imaging.    -MEDICATIONS: Ordered gabapentin 100 mg that he can increase slowly over time.  Have given him a chart so that he can do this.  He will be on up to 300 mg 3 times a day.  -  Discussed side effects of medications and proper use. Patient verbalized understanding.    -PHYSICAL THERAPY: Patient had 4 sessions of recent physical therapy.  I recommended he continue doing his exercises on a daily basis.    -PATIENT EDUCATION: We discussed pain management in a multimodal fashion including physical therapy, medication management, spinal cord stimulator trial possibilities as well as imaging.    -FOLLOW UP: The patient will follow-up in 4 weeks.  Advised to contact clinic if symptoms worsen or change.    Subjective:     Chris Fleming is a 89 y.o. male who presents today for follow-up regarding low back  and left lower limb pain.  Patient reports that he has had low back and left lower limb pain for several years.  He has a history of prostate cancer, however this is not progressed in some time.  He recently was prescribed Butrans patches from his primary care provider and is taking Aleve for pain as well.  He reports that his pain is in his low back and his posterior lateral left lower limb to just above the foot.  There is numbness and tingling he denies any changes with bowel or bladder.  He has been wearing depends for some time now.  The patient has been seen by Swati Valencia in the spine center and sent to me for discussion of spinal cord stimulator trial.  His pain today is 9/10 as his worst is 10/10 as best as 5/10.  Pain is worse with any sort of movement and improved with rest.  He has had 4 sessions of physical therapy and is currently not doing the exercises.  He is here with his wife who is helpful in planning.  Patient reports that he does not want to have any sort of lumbar surgery and does not want to investigate this further.  He has had injections recently.  The injection at L4-5 transforaminal epidural steroid injection was initially helpful with the numbing medication, however shortly wore off afterwards.  He had minimal relief with his L5-S1 transforaminal epidural steroid injection.  He has not been on gabapentin.    -Treatment to Date: No prior spinal surgery.  Physical therapy [facility] x 4 sessions 4/2019 recently with minimal benefit, some soreness with exercises.     L3-4 IL CADEN 5/20/2009 CD[facility] with benefit.  Left L5-S1 TFESI 4/11/2019 with 100% relief x1 day, then 20% thereafter.  Preprocedure pain 9/10, pulse 4/10.  Left L4-5 TF CADEN 5/15/2019 with minimal to no lasting benefit.  Preprocedure pain 5/10, post 5/10.    There is no problem list on file for this patient.      Current Outpatient Medications on File Prior to Encounter   Medication Sig Dispense Refill     ascorbate calcium (VITAMIN  C ORAL) Take 1 tablet by mouth daily.       buprenorphine (BUTRANS) 5 mcg/hour PTWK patch ESME 1 PATCH TOPICALLY ONCE A WEEK FOR 4 WEEKS  0     capsaicin (ZOSTRIX) 0.025 % cream Apply three times a day for knee pain       Colostrum/selen/gr t/Mush Cmb1 (IMMUNE TRIO ORAL) Take 250 mg by mouth daily.       donepezil (ARICEPT) 10 MG tablet Take 1 tablet by mouth 2 (two) times a day.       ergocalciferol (ERGOCALCIFEROL) 50,000 unit capsule Take 1 capsule by mouth once a week.       glucosamine-chondroit-vit C-Mn (GLUCOSAMINE 1500 COMPLEX) 500-400 mg cap Take 2 tablets by mouth daily.       naproxen sodium (ALEVE) 220 MG tablet Take 2 tablets by mouth 2 (two) times a day.       omega-3 fatty acids (FISH OIL CONCENTRATE) 1,000 mg cap Take 1 capsule by mouth daily.       tamsulosin (FLOMAX) 0.4 mg cap Take 1 capsule by mouth daily.       No current facility-administered medications on file prior to encounter.        Allergies   Allergen Reactions     Memantine Unknown       Past Medical History:   Diagnosis Date     Alzheimer's dementia      Hypertension         Review of Systems  ROS:  Specifically negative for bowel/bladder dysfunction, balance changes, headache, dizziness, foot drop, fevers, chills, appetite changes, nausea/vomiting, unexplained weight loss. Otherwise 13 systems reviewed are negative. Please see the patient's intake questionnaire from today for details.    Reviewed Social, Family, Past Medical and Past Surgical history with patient, no significant changes noted since prior visit.     Objective:     /56 (Patient Site: Right Arm, Patient Position: Sitting, Cuff Size: Adult Regular)   Pulse 67   Temp 97.6  F (36.4  C) (Oral)   Resp 18   Wt 204 lb (92.5 kg)   SpO2 92%   BMI 28.45 kg/m      PHYSICAL EXAMINATION:    --CONSTITUTIONAL: Well developed, well nourished, healthy appearing individual.  --PSYCHIATRIC: Appropriate mood and affect. No difficulty interacting due to temper, social withdrawal,  or memory issues.  --SKIN: Lumbar region is dry and intact.   --RESPIRATORY: Normal rhythm and effort. No abnormal accessory muscle breathing patterns noted.   --MUSCULOSKELETAL:  Normal lumbar lordosis noted, no lateral shift.  --GROSS MOTOR: Easily arises from a seated position. Gait is non-antalgic  --LUMBAR SPINE:  Inspection reveals no evidence of deformity. Range of motion is not limited in lumbar flexion, extension, lateral rotation.  He has tenderness palpation along the left greater than right lumbar paraspinal muscles and left PSIS area and buttock. Straight leg raising in the supine position is negative to radicular pain. Sciatic notch non-tender.   --SACROILIAC JOINT: Negative distraction.  Negative Meli's with reproduction of pain to affected extremity. One Finger point test negative.  --LOWER EXTREMITY MOTOR TESTING:  Plantar flexion left 5/5, right 5/5   Dorsiflexion left 5/5, right 5/5   Great toe MTP extension left 5/5, right 5/5  Knee flexion left 5/5, right 5/5  Knee extension left 5/5, right 5/5   Hip flexion left 5/5, right 5/5  Hip abduction left 5/5, right 5/5  Hip adduction left 5/5, right 5/5   --HIPS: Full range of motion bilaterally.  Stinchfield test is positive on the left.  --NEUROLOGIC: Bilateral patellar and achilles reflexes are physiologic and symmetric. Sensation to light touch is intact in the bilateral L4, L5, and S1 dermatomes.    RESULTS:   Imaging: Lumbar spine imaging was reviewed today. The images were shown to the patient and the findings were explained using a spine model.

## 2021-05-31 NOTE — PROGRESS NOTES
Assessment:     Diagnoses and all orders for this visit:    Left lumbar radiculitis  -     Ambulatory referral to Acupuncture    Lumbar foraminal stenosis  -     Ambulatory referral to Acupuncture    Spinal stenosis of lumbar region without neurogenic claudication  -     Ambulatory referral to Acupuncture    Chronic bilateral low back pain with left-sided sciatica  -     Ambulatory referral to Acupuncture    DDD (degenerative disc disease), lumbar  -     Ambulatory referral to Acupuncture       Chris Fleming is a 89 y.o. y.o. male with past medical history significant for Alzheimer's disease-lives with wife, hypertension, knee replacement 2009, BPH, ED, lumbar stenosis who presents today for follow-up regarding low back and left lower limb pain:    -The patient is here with his wife who reports that he has had improvements with low-dose gabapentin.  He continues to have low back pain with radicular symptoms as well as pain is likely secondary to spinal stenosis with neurogenic claudication.  We discussed acupuncture and I have ordered this for him.     Plan:     A shared decision making plan was used. The patient's values and choices were respected. Prior medical records from our last visit on 7/22/2019 were reviewed today. The following represents what was discussed and decided upon by the provider and the patient.        -DIAGNOSTIC TESTS: Images were personally reviewed and interpreted.   --Lumbar spine MRI CDI shows central stenosis that is severe at L4-5 and L2-3.  Moderate to severe at L3-4 and moderate at L5-S1.  Progression of L5-S1 since 2009 noted.  There is left paracentral disc herniation at L5-S1 with mild S1 nerve root compression that is new.  Multilevel hypertrophic facet and multilevel chronic foraminal stenosis progression at L4-5 and L5-S1 since 2009.  --X-ray of left hip dated 7/24/2019 is personally reviewed and images interpreted and showed the patient.  There is mild arthritis in the left  hip.  There are no fractures or dislocations.  --MRI of the thoracic spine dated 8/8/2019 is personally reviewed and images interpreted and shown the patient.  Axial images were not acquired secondary to the patient not be able to tolerate the entire MRI.  It does show moderate degenerative changes in the thoracic spine with ankylosis of several lower thoracic interspaces.  There is no high-grade thoracic spinal canal stenosis or foraminal stenosis identified in the thoracic spine.  Moderate to severe lumbar spinal stenosis at L1 to and L2-3.    -INTERVENTIONS: No interventions at this time.    -MEDICATIONS: Patient is currently taking gabapentin 100 mg 3 times a day.  I recommend that he increase his bedtime dose to 200 mg.  -  Discussed side effects of medications and proper use. Patient verbalized understanding.    -PHYSICAL THERAPY: Patient is done several sessions of physical therapy with no improvement.  I recommend that he continue to do his home exercises on a daily basis.    -PATIENT EDUCATION: We discussed pain management in a multimodal fashion including physical therapy, medication management, acupuncture.  We also discussed spinal cord stimulator.    -FOLLOW UP: Patient will follow-up in 6 weeks.  Advised to contact clinic if symptoms worsen or change.    Subjective:     Chris Fleming is a 89 y.o. male who presents today for follow-up regarding low back and left lower limb pain.  Patient his wife report that his pain is worse with walking and standing.  Also worsens when his gabapentin seems to be wearing off or if he is doing too much exercise.  Pain is improved with rest as well as gabapentin.  He is done several sessions of physical therapy in the past with no relief.  He is currently not doing any of the exercises he is learned.  Gabapentin has been helpful.  He is taking 100 mg 3 times a day.  His wife notes that he is memory has been worse on the gabapentin.  At baseline his memory is not very  good.  They wonder if acupuncture would be something that would be helpful for him.  He describes pain in his low back and down the posterior thigh and calf into the foot.  His pain today is 5/10 at its worst is 10/10.  He denies any bowel or bladder changes, fevers, chills, unintentional weight loss.    -Treatment to Date: No prior spinal surgery.  Physical therapy [facility] x 4 sessions 4/2019 recently with minimal benefit, some soreness with exercises.  MRI of the thoracic spine dated 8/8/2019.  X-ray of left hip dated 7/24/2019.     L3-4 IL CADEN 5/20/2009 CD[facility] with benefit.  Left L5-S1 TFESI 4/11/2019 with 100% relief x1 day, then 20% thereafter.  Preprocedure pain 9/10, pulse 4/10.  Left L4-5 TF CADEN 5/15/2019 with minimal to no lasting benefit.  Preprocedure pain 5/10, post 5/10.    There is no problem list on file for this patient.      Current Outpatient Medications on File Prior to Encounter   Medication Sig Dispense Refill     ascorbate calcium (VITAMIN C ORAL) Take 1 tablet by mouth daily.       Colostrum/selen/gr t/Mush Cmb1 (IMMUNE TRIO ORAL) Take 250 mg by mouth daily.       donepezil (ARICEPT) 10 MG tablet Take 1 tablet by mouth 2 (two) times a day.       ergocalciferol (ERGOCALCIFEROL) 50,000 unit capsule Take 1 capsule by mouth once a week.       gabapentin (NEURONTIN) 100 MG capsule Take 100 mg by mouth 3 (three) times a day. Increase as instructed to 3 times a day. 270 capsule 1     glucosamine-chondroit-vit C-Mn (GLUCOSAMINE 1500 COMPLEX) 500-400 mg cap Take 2 tablets by mouth daily.       omega-3 fatty acids (FISH OIL CONCENTRATE) 1,000 mg cap Take 1 capsule by mouth daily.       tamsulosin (FLOMAX) 0.4 mg cap Take 1 capsule by mouth daily.       buprenorphine (BUTRANS) 5 mcg/hour PTWK patch ESME 1 PATCH TOPICALLY ONCE A WEEK FOR 4 WEEKS  0     capsaicin (ZOSTRIX) 0.025 % cream Apply three times a day for knee pain       naproxen sodium (ALEVE) 220 MG tablet Take 2 tablets by mouth 2 (two) times a day.        No current facility-administered medications on file prior to encounter.        Allergies   Allergen Reactions     Memantine Unknown       Past Medical History:   Diagnosis Date     Alzheimer's dementia      Hypertension         Review of Systems  ROS:  Specifically negative for bowel/bladder dysfunction, balance changes, headache, dizziness, foot drop, fevers, chills, appetite changes, nausea/vomiting, unexplained weight loss. Otherwise 13 systems reviewed are negative. Please see the patient's intake questionnaire from today for details.    Reviewed Social, Family, Past Medical and Past Surgical history with patient, no significant changes noted since prior visit.     Objective:     BP 98/56 (Patient Site: Right Arm, Patient Position: Sitting, Cuff Size: Adult Regular)   Pulse 75   Temp 98.3  F (36.8  C) (Oral)   Resp 18   Wt 191 lb 8 oz (86.9 kg)   SpO2 93%   BMI 26.71 kg/m      PHYSICAL EXAMINATION:    --CONSTITUTIONAL: Well developed, well nourished, healthy appearing individual.  --PSYCHIATRIC: Appropriate mood and affect. No difficulty interacting due to temper, social withdrawal, or memory issues.  --SKIN: Lumbar region is dry and intact.   --RESPIRATORY: Normal rhythm and effort. No abnormal accessory muscle breathing patterns noted.   --MUSCULOSKELETAL:  Normal lumbar lordosis noted, no lateral shift.  --GROSS MOTOR: Easily arises from a seated position. Gait is non-antalgic  --LUMBAR SPINE:  Inspection reveals no evidence of deformity. Range of motion is not limited in lumbar flexion, extension, lateral rotation.  He has tenderness palpation along his left greater than right low back.  Straight leg raising in the seated position is negative to radicular pain.    --LOWER EXTREMITY MOTOR TESTING:  Plantar flexion left 5/5, right 5/5   Dorsiflexion left 5/5, right 5/5   Great toe MTP extension left 5/5, right 5/5  Knee flexion left 5/5, right 5/5  Knee extension left 5/5, right 5/5   Hip flexion  left 5/5, right 5/5  Hip abduction left 5/5, right 5/5  Hip adduction left 5/5, right 5/5   --NEUROLOGIC:  Sensation to light touch is intact in the bilateral L4, L5, and S1 dermatomes.    RESULTS:   Imaging: Lumbar and thoracic spine imaging was reviewed today.     Result Date: 8/12/2019  [facility] MR THORACIC SPINE WO CONTRAST LTD 8/8/2019 2:28 PM INDICATION: Spinal cord stimulator trial. Left lumbar radiculitis. Lumbar foraminal stenosis. Degenerative disc disease, lumbar. Spinal stenosis of lumbar region. TECHNIQUE: Noncontrast MRI of the thoracic spine. Patient was unable to perform the entire exam. Localizer, coronal T1, sagittal T2, and sagittal T1 images of the thoracic spine were acquired. CONTRAST: None. COMPARISON: None. FINDINGS: Chronic-appearing height loss of multiple thoracic and upper visualized lumbar vertebral bodies. No STIR sequence was acquired, but there is no definite edema on the sagittal T1 sequences to indicate acute vertebral body injury. Several of the thoracic interspaces between T8 and T12 appear partially ankylosed. There is diffuse mild to moderate thoracic interspace narrowing without large focal disc herniation. Mild facet arthropathy. The thoracic spinal canal and neural foramina appear adequate. Degenerative changes at L1-L2 and L2-L3 visualized in the lower sagittal T2 sequence of the thoracic spine demonstrate suspected moderate to severe lumbar spinal canal stenosis and mild to moderate foraminal narrowing. No definite thoracic spinal cord signal abnormality.     CONCLUSION: 1.  Patient was unable to perform the entire exam. No axial imaging was acquired through the thoracic spine. 2.  Mild to moderate degenerative changes in the thoracic spine with ankylosis of several lower thoracic interspaces as detailed above. No high-grade spinal canal and neural foraminal stenosis identified in the thoracic spine. 3.  Degenerative changes in the upper visualized lumbar spine  partially characterized at L1-L2 and L2-L3 where there appears to be moderate to severe lumbar spinal canal stenosis. This would be better evaluated with dedicated lumbar spine MRI.    Xr Pelvis W 2 Vw Hip Left    Result Date: 7/24/2019  EXAM: XR PELVIS W 2 VW HIP LEFT LOCATION:  DATE/TIME: 7/24/2019 11:22 AM INDICATION: Pain. Radiculopathy, lumbar region COMPARISON: None. FINDINGS: Minimal degenerative changes of left hip. No fracture or dislocation.

## 2021-05-31 NOTE — TELEPHONE ENCOUNTER
----- Message from Javy Liu, DO sent at 8/12/2019 10:20 AM CDT -----  Please call the patient to let her know I reviewed his MRI of his thoracic spine.  I do note that there is adequate space for spinal cord stimulator trial should be proceed with this.  Please have the patient follow-up as scheduled.

## 2021-05-31 NOTE — TELEPHONE ENCOUNTER
Call to pt with provider's results and recommendations. Spoke with pt's wife, Mattie. They will proceed with follow-up appt with Dr. Liu on 8/16.

## 2021-05-31 NOTE — PATIENT INSTRUCTIONS - HE
I ordered acupuncture for you at the pain center.    I recommend that you continue taking gabapentin 100 mg in the morning and afternoon and 2 in the evening.    ~Please call Nurse Navigation line [phone] with any questions or concerns about your treatment plan, if symptoms worsen and you would like to be seen urgently, or if you have problems controlling bladder and bowel function.

## 2021-06-01 NOTE — PATIENT INSTRUCTIONS - HE
Please continue taking gabapentin.    It is okay for you to take Aleve as needed with food while you are taking gabapentin.    ~Please call Nurse Navigation line [phone] with any questions or concerns about your treatment plan, if symptoms worsen and you would like to be seen urgently, or if you have problems controlling bladder and bowel function.

## 2021-06-01 NOTE — PROGRESS NOTES
Assessment:     Diagnoses and all orders for this visit:    Left lumbar radiculitis    Lumbar foraminal stenosis    DDD (degenerative disc disease), lumbar    Chronic bilateral low back pain with left-sided sciatica    Spinal stenosis of lumbar region without neurogenic claudication       Chris Fleming is a 89 y.o. y.o. male with past medical history significant for Alzheimer's disease-lives with wife, hypertension, knee replacement 2009, BPH, ED, lumbar stenosis  who presents today for follow-up regarding low back and left lower limb pain:    -Overall the patient reports that he continues to do better with gabapentin.  He does have good days and bad days, however the gripping pain he was having is much improved.  His pain is likely secondary to lumbar spinal stenosis and lumbar radiculopathy.  At this time they would like to continue with current plan and if pain worsens possibly entertain the idea of a spinal cord stimulator trial at that time.     Plan:     A shared decision making plan was used. The patient's values and choices were respected. Prior medical records from our last visit on 8/16/2019 were reviewed today. The following represents what was discussed and decided upon by the provider and the patient.        -DIAGNOSTIC TESTS: Images were personally reviewed and interpreted.   --Lumbar spine MRI [facility] shows central stenosis that is severe at L4-5 and L2-3.  Moderate to severe at L3-4 and moderate at L5-S1.  Progression of L5-S1 since 2009 noted.  There is left paracentral disc herniation at L5-S1 with mild S1 nerve root compression that is new.  Multilevel hypertrophic facet and multilevel chronic foraminal stenosis progression at L4-5 and L5-S1 since 2009.  --X-ray of left hip dated 7/24/2019 is personally reviewed and images interpreted and showed the patient.  There is mild arthritis in the left hip.  There are no fractures or dislocations.  --MRI of the thoracic spine dated 8/8/2019 is personally  reviewed and images interpreted.  Axial images were not acquired secondary to the patient not be able to tolerate the entire MRI.  It does show moderate degenerative changes in the thoracic spine with ankylosis of several lower thoracic interspaces.  There is no high-grade thoracic spinal canal stenosis or foraminal stenosis identified in the thoracic spine.  Moderate to severe lumbar spinal stenosis at L1-2 and L2-3.    -INTERVENTIONS: No interventions at this time.    -MEDICATIONS: The patient will continue with gabapentin 300 mg in the morning and afternoon and 600 mg in the evening.  It is fine for him to also take Aleve as needed with food.  -  Discussed side effects of medications and proper use. Patient verbalized understanding.    -PHYSICAL THERAPY: Recommend the patient continue doing physical therapy exercises at home.        -PATIENT EDUCATION:  20 minutes of total visit time was spent face to face with the patient today, 60 % of the visit was spent on counseling, education, and coordinating care.   -5 minutes spent outside of visit time, non-face-to-face time, reviewing chart.    -FOLLOW UP: The patient will follow up with Swati Valencia as needed.  Advised to contact clinic if symptoms worsen or change.    Subjective:     Chris Fleming is a 89 y.o. male who presents today for follow-up regarding chronic low back and left lower limb pain.  Patient reports that pain has improved since he started taking gabapentin.  He is taking gabapentin 300 mg in the morning and afternoon and 600 mg at bedtime.  His wife reports that he seems to be doing better with this and is not complaining of the gripping pain that he had been before his left lower extremity.  They are awaiting an appointment for acupuncture.  His wife reports that he has some good days and more better days than he had in the past.  He denies any new bowel or bladder changes, fevers, chills, unintentional weight loss.  His pain today is 7/10.  It  is hard for him to determine what increase his pain, however pain is improved with rest and medications.  At this time would like to continue with current plan without doing a spinal cord stimulator trial.  Pain worsen they like to reconsider this.    -Treatment to Date: No prior spinal surgery.  Physical therapy [facility] x 4 sessions 4/2019 recently with minimal benefit, some soreness with exercises.  MRI of the thoracic spine dated 8/8/2019.  X-ray of left hip dated 7/24/2019.     L3-4 IL CADEN 5/20/2009 [facility] with benefit.  Left L5-S1 TFESI 4/11/2019 with 100% relief x1 day, then 20% thereafter.  Preprocedure pain 9/10, pulse 4/10.  Left L4-5 TF CADEN 5/15/2019 with minimal to no lasting benefit.  Preprocedure pain 5/10, post 5/10.    There is no problem list on file for this patient.      Current Outpatient Medications on File Prior to Encounter   Medication Sig Dispense Refill     ascorbate calcium (VITAMIN C ORAL) Take 1 tablet by mouth daily.       Colostrum/selen/gr t/Mush Cmb1 (IMMUNE TRIO ORAL) Take 250 mg by mouth daily.       donepezil (ARICEPT) 10 MG tablet Take 1 tablet by mouth 2 (two) times a day.       ergocalciferol (ERGOCALCIFEROL) 50,000 unit capsule Take 1 capsule by mouth once a week.       gabapentin (NEURONTIN) 100 MG capsule Take 100 mg by mouth 3 (three) times a day. Increase as instructed to 3 times a day. (Patient taking differently: Take 100 mg by mouth 3 (three) times a day. Increase as instructed to 3 times a day.      ) 270 capsule 1     glucosamine-chondroit-vit C-Mn (GLUCOSAMINE 1500 COMPLEX) 500-400 mg cap Take 2 tablets by mouth daily.       methyl salicylate-menthol (ICY HOT) 30-10 % Crea Apply topically. ICY HOT CREAM       omega-3 fatty acids (FISH OIL CONCENTRATE) 1,000 mg cap Take 1 capsule by mouth daily.       tamsulosin (FLOMAX) 0.4 mg cap Take 1 capsule by mouth daily.       [DISCONTINUED] naproxen sodium (ALEVE) 220 MG tablet Take 2 tablets by mouth 2 (two) times a day.        buprenorphine (BUTRANS) 5 mcg/hour PTWK patch ESME 1 PATCH TOPICALLY ONCE A WEEK FOR 4 WEEKS  0     [DISCONTINUED] capsaicin (ZOSTRIX) 0.025 % cream Apply three times a day for knee pain       No current facility-administered medications on file prior to encounter.        Allergies   Allergen Reactions     Memantine Unknown       Past Medical History:   Diagnosis Date     Alzheimer's dementia      Hypertension         Review of Systems  ROS:  Specifically negative for bowel/bladder dysfunction, balance changes, headache, dizziness, foot drop, fevers, chills, appetite changes, nausea/vomiting, unexplained weight loss. Otherwise 13 systems reviewed are negative. Please see the patient's intake questionnaire from today for details.    Reviewed Social, Family, Past Medical and Past Surgical history with patient, no significant changes noted since prior visit.     Objective:     /68 (Patient Site: Right Arm, Patient Position: Sitting, Cuff Size: Adult Regular)   Pulse 63   Temp 97.2  F (36.2  C) (Oral)   Resp 18   Wt 189 lb 9.6 oz (86 kg)   SpO2 97%   BMI 26.44 kg/m      PHYSICAL EXAMINATION:    --CONSTITUTIONAL: Well developed, well nourished, healthy appearing individual.  --PSYCHIATRIC: Appropriate mood and affect. No difficulty interacting due to temper, social withdrawal, or memory issues.  --SKIN: Lumbar region is dry and intact.   --RESPIRATORY: Normal rhythm and effort. No abnormal accessory muscle breathing patterns noted.   --MUSCULOSKELETAL:  Normal lumbar lordosis noted, no lateral shift.  --GROSS MOTOR: Easily arises from a seated position. Gait is non-antalgic  --LUMBAR SPINE:  Inspection reveals no evidence of deformity. Range of motion is not limited in lumbar flexion, extension, lateral rotation. No tenderness to palpation lumbar spine. Straight leg raising in the seated position is negative to radicular pain. Sciatic notch non-tender.   --LOWER EXTREMITY MOTOR TESTING:  Plantar flexion left  5/5, right 5/5   Dorsiflexion left 5/5, right 5/5   Great toe MTP extension left 5/5, right 5/5  Knee flexion left 5/5, right 5/5  Knee extension left 5/5, right 5/5   Hip flexion left 5/5, right 5/5  Hip abduction left 5/5, right 5/5  Hip adduction left 5/5, right 5/5   --NEUROLOGIC: Sensation to light touch is intact in the bilateral L4, L5, and S1 dermatomes.    RESULTS:   Imaging: Lumbar spine imaging was reviewed today.

## 2021-06-02 NOTE — TELEPHONE ENCOUNTER
Phone call to patient to inform that prescription was authorized and has been sent to their pharmacy. Left detailed message on dedicated voicemail.

## 2021-06-02 NOTE — TELEPHONE ENCOUNTER
Patient's wife calling to inform provider that they have found a helpful dose of Gabapentin for patient. He is currently taking Gabapentin 2 capsules two times a day. He is then taking Aleve during the day if needed. She is asking for a new prescription to be sent in as they will be heading out of town in a few days and will not have enough.     Prescription prepped with new directions. Sent for provider review and authorization. Pharmacy verified.

## 2021-06-02 NOTE — PROGRESS NOTES
"ACUPUNCTURIST TREATMENT NOTE    Name: Chris Fleming  :  1926  MRN:  [id number]      Acupuncture Treatment  Patient Type: JN Pain  Intervention Reason: Pain  Pre-session Pain Ratin  Post-session Pain Ratin  Patient complaint:: Patient is seen for first treatment today. He would like to address chronic pain in left side low back, radiating down LLE to ankle. Denies any tingling or numbness in feet. The pain comes and goes depending on activity and position. His wife is present during visit and she tells me he naps well during the day, but is often awake at night, but she does not attribute this to pain waking him. Today pain is rating 8/10 in LLE. He is often cold and pain is worse in cold weather.   Acupuncture (Points):: Ub62, Ki3, Sp6, Gb34, Si3, Baihui, Ub23, Yaoyan. 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 Infrared heat lamp over low back. Tuina with posumon to low back and LLE.  Treatment Plan/Follow up: Patient to schedule 4-6 weekly visits as schedule allows then re-evaluate. Patient is also scheduled for massage soon. Discussed using heat to low back and some gentle stretching to piriformis.  Checklist: Progress Note Completed;Consent Reveiwed  Follow up comments: Patient stated after treatment his pain felt \"a little better, but rated same as prior to treament. He had difficulty relaxing in prone position due to runny and clogged sinuses.     \"Risks and benefits of acupuncture were discussed with patient. Consent for treatment was given. We thank you for the referral.\"     Scarlett Church L.Ac.    Date:  10/23/2019  Time:  1:54 PM    "

## 2021-06-04 NOTE — PROGRESS NOTES
"ACUPUNCTURIST TREATMENT NOTE    Name: Chris Fleming  :  1926  MRN:  [id number]      Acupuncture Treatment  Patient Type: JN Pain  Intervention Reason: Pain  Pre-session Pain Ratin  Post-session Pain Ratin  Patient complaint:: Patient reports low back pain, left sided, and radiating down left leg to just below knee.   Acupuncture (Points):: Baihui, Du24, Yintang. Left: Ub62, Ub60, Gb34, St36, Ub40, Ub37, Ub36, Gb31, Gb29, Gb30, Ub53, Ub54, Yaoyan, HTJJ L2-L5, Ub23-26, Ub31-34  TCM Diagnosis:  kidney yang and yin deficiency, cold damp bi syndrome  Practitioner Observed: 30 minute treatment. Infrared heat lamp over low back. Tuina with posumon to low back/LLE.  Checklist: Progress Note Completed;Consent Reveiwed  Follow up comments: Patient fell asleep and relaxed well during treatment.    \"Risks and benefits of acupuncture were discussed with patient. Consent for treatment was given. We thank you for the referral.\"     Scarlett Church L.Ac.    Date:  2019  Time:  2:05 PM    "

## 2021-06-04 NOTE — PROGRESS NOTES
"ACUPUNCTURIST TREATMENT NOTE    Name: Chris Fleming  :  1926  MRN:  [id number]      Acupuncture Treatment  Patient Type: JN Pain  Intervention Reason: Pain  Pre-session Pain Ratin  Patient complaint:: Patient presents for 3rd treatment today. He reports left sided low back pain radiating down LLE to great toe. He reports his pain is \"about the same,\" however, rates pain lower than previous visits at 5/10.  Acupuncture (Points):: Baihui, Du24, Yintang. 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 Infrared heat lamp over low back.  Checklist: Progress Note Completed;Consent Reveiwed  Follow up comments: Patient fell asleep and relaxed well during treatment. After treatment he reported that he felt \"light\" and denied pain in LLE, only reported some pain remaining in low back. He could not provide follow up pain score.     \"Risks and benefits of acupuncture were discussed with patient. Consent for treatment was given. We thank you for the referral.\"     Scarlett Church L.Ac.    Date:  2020  Time:  1:46 PM    "

## 2021-06-05 NOTE — PROGRESS NOTES
"ACUPUNCTURIST TREATMENT NOTE    Name: Chris Fleming  :  1926  MRN:  [id number]      Acupuncture Treatment  Patient Type: JN Pain  Intervention Reason: Pain  Pre-session Pain Ratin  Patient complaint:: Patient presents for 5th treatment today. Both he and his wife report his pain is doing better. His wife tells me he is sleeping better, and she has cut down on his pain medications at night by half. She also tells me she thinks more of his pain is now in his left knee vs low back.   Acupuncture (Points):: Baihui, Du24, Yintang, Si3, Si6, Shiqizhuixia. Left: Ub62, Ub60, Gb34, St36, Ub40, Gb31, Gb29, Yaoyan, HTJJ L2-L5, Ub23-26, Ub31-34, Xiyan, St34, Sp10  E-Stim: micro current 2 x 100Hz (L) Pe56-Gr60, (L) Dr60-Vt93  TCM Diagnosis:  kidney yang and yin deficiency, cold damp bi syndrome  Practitioner Observed: 30 minute treatment. Infrared heat lamp over low back. Tuina with posumon to low back/LLE.  Checklist: Progress Note Completed;Consent Reveiwed  Follow up comments: Patient fell asleep and relaxed well during treatment.    \"Risks and benefits of acupuncture were discussed with patient. Consent for treatment was given. We thank you for the referral.\"     Scarlett Church L.Ac.    Date:  2020  Time:  2:18 PM    "

## 2021-06-05 NOTE — PROGRESS NOTES
"ACUPUNCTURIST TREATMENT NOTE    Name: Chris Fleming  :  1926  MRN:  [id number]      Acupuncture Treatment  Patient Type: JN Pain  Intervention Reason: Pain  Pre-session Pain Ratin  Patient complaint:: Patient presents for 4th treatment today with his wife. He reports that his back pain is \"not bad\" and rates it 6/10. He states the back is a constant pain, but the LLE pain only comes sometimes. He states his left knee has felt better. His wife tells me from her observation she thinks his pain is improved, as he is moving around better in general, although he will still have times where she can tell he has more difficulty walking. Today patient is moving well and able to walk quickly.  Acupuncture (Points):: Baihui, Du24, Yintang, (R) Si3. Left: Ub62, Ub60, Ub57, Gb34, St36, Ub40, Xiyan, Ub37, Gb31, Gb20, Gb29, Ub53, Ub54, HTJJ L2-L5, Shiqizhuixia, Ub23-26, Ub31-34  E-Stim: micro current 2 x 100Hz (L) HTJJ L2-Ub34, (L) Dx42-Cs79  TCM Diagnosis:  kidney yang and yin deficiency, cold damp bi syndrome  Practitioner Observed: 30 minute treatment. Infrared heat lamp over low back. Tuina with posumon to low back and LLE.  Checklist: Progress Note Completed;Consent Reveiwed  Follow up comments: Patient fell asleep and relaxed well during treatment.    \"Risks and benefits of acupuncture were discussed with patient. Consent for treatment was given. We thank you for the referral.\"     Scarlett Church L.Ac.    Date:  2020  Time:  1:45 PM    "

## 2021-06-13 NOTE — PROGRESS NOTES
"Chris Fleming is a 89 y.o. male who is being evaluated via a billable telephone visit.      The patient has been notified of following:     \"This telephone visit will be conducted via a call between you and your physician/provider. We have found that certain health care needs can be provided without the need for a physical exam.  This service lets us provide the care you need with a short phone conversation.  If a prescription is necessary we can send it directly to your pharmacy.  If lab work is needed we can place an order for that and you can then stop by our lab to have the test done at a later time.    Telephone visits are billed at different rates depending on your insurance coverage. During this emergency period, for some insurers they may be billed the same as an in-person visit.  Please reach out to your insurance provider with any questions.    If during the course of the call the physician/provider feels a telephone visit is not appropriate, you will not be charged for this service.\"    Patient has given verbal consent to a Telephone visit? Yes    What phone number would you like to be contacted at? [phone]     Patient would like to receive their AVS by AVS Preference: Mail a copy.    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 y.o. male with past medical history significant for Alzheimer's disease-lives with wife, hypertension, knee replacement 2009, BPH, ED, lumbar stenosis who presents today for follow-up regarding low back and left lower extremity pain:    -Overall the patient has had improvement of pain.  He does have some dementia and his wife is very helpful in discussion of how he is doing.  His pain is likely secondary to lumbar spinal stenosis with " neurogenic claudication.     Plan:     A shared decision making plan was used. The patient's values and choices were respected. Prior medical records from our last visit on 9/27/2019 were reviewed today. The following represents what was discussed and decided upon by the provider and the patient.        -DIAGNOSTIC TESTS: Images were personally reviewed and interpreted.   --Lumbar spine MRI [facility] shows central stenosis that is severe at L4-5 and L2-3.  Moderate to severe at L3-4 and moderate at L5-S1.  Progression of L5-S1 since 2009 noted.  There is left paracentral disc herniation at L5-S1 with mild S1 nerve root compression that is new.  Multilevel hypertrophic facet and multilevel chronic foraminal stenosis progression at L4-5 and L5-S1 since 2009.  --X-ray of left hip dated 7/24/2019 is personally reviewed and images interpreted and showed the patient.  There is mild arthritis in the left hip.  There are no fractures or dislocations.  --MRI of the thoracic spine dated 8/8/2019 is personally reviewed and images interpreted.  Axial images were not acquired secondary to the patient not be able to tolerate the entire MRI.  It does show moderate degenerative changes in the thoracic spine with ankylosis of several lower thoracic interspaces.  There is no high-grade thoracic spinal canal stenosis or foraminal stenosis identified in the thoracic spine.  Moderate to severe lumbar spinal stenosis at L1-2 and L2-3.    -INTERVENTIONS: No interventions at this time.    -MEDICATIONS: I refilled his gabapentin 200 mg in the morning and 100 mg at bedtime.  -  Discussed side effects of medications and proper use. Patient verbalized understanding.    -PHYSICAL THERAPY: Recommend he continue doing his physical therapy exercises at home on a daily basis.  Discussed the importance of core strengthening, ROM, stretching exercises with the patient and how each of these entities is important in decreasing pain.  Explained to the patient  that the purpose of physical therapy is to teach the patient a home exercise program.  These exercises need to be performed every day in order to decrease pain and prevent future occurrences of pain.        -PATIENT EDUCATION: We discussed pain management in a multimodal fashion including physical therapy, medication management, possible future injections.    -FOLLOW UP: Patient will follow up as needed.  Advised to contact clinic if symptoms worsen or change.    Subjective:     Chris Fleming is a 89 y.o. male who presents today for follow-up regarding low back and left lower extremity pain.  Patient reports that most of his pain is in his low back.  His pain is worse with walking and improved with gabapentin.  His pain today is 2/10 he is feeling better.  His wife notes that gabapentin does seem to be helping and he takes 200 mg in the morning and 100 mg at bedtime.  It may make him a little bit foggy, however she feels that it is more of a benefit for him to have the pain relief that he is getting.  He did do acupuncture after I saw him last and found this to be helpful.  He is also having knee pain.  He is able to walk around better than he was prior to starting gabapentin.  He like a refill of this.  He denies any new bowel or bladder changes, fevers, chills, unintentional weight loss.    -Treatment to Date: No prior spinal surgery.  Physical therapy [facility] x 4 sessions 4/2019 recently with minimal benefit, some soreness with exercises.  MRI of the thoracic spine dated 8/8/2019.  X-ray of left hip dated 7/24/2019.     L3-4 IL CADEN 5/20/2009 [facility] with benefit.  Left L5-S1 TFESI 4/11/2019 with 100% relief x1 day, then 20% thereafter.  Preprocedure pain 9/10, pulse 4/10.  Left L4-5 TF CADEN 5/15/2019 with minimal to no lasting benefit.  Preprocedure pain 5/10, post 5/10.       There is no problem list on file for this patient.      Current Outpatient Medications on File Prior to Encounter   Medication Sig Dispense Refill      ascorbate calcium (VITAMIN C ORAL) Take 1 tablet by mouth daily.       buprenorphine (BUTRANS) 5 mcg/hour PTWK patch ESME 1 PATCH TOPICALLY ONCE A WEEK FOR 4 WEEKS  0     Colostrum/selen/gr t/Mush Cmb1 (IMMUNE TRIO ORAL) Take 250 mg by mouth daily.       donepezil (ARICEPT) 10 MG tablet Take 1 tablet by mouth 2 (two) times a day.       ergocalciferol (ERGOCALCIFEROL) 50,000 unit capsule Take 1 capsule by mouth once a week.       gabapentin (NEURONTIN) 100 MG capsule TAKE 2 CAPSULES BY MOUTH IN THE MORNING AND ONE AT BEDTIME 270 capsule 2     glucosamine-chondroit-vit C-Mn (GLUCOSAMINE 1500 COMPLEX) 500-400 mg cap Take 2 tablets by mouth daily.       methyl salicylate-menthol (ICY HOT) 30-10 % Crea Apply topically. ICY HOT CREAM       omega-3 fatty acids (FISH OIL CONCENTRATE) 1,000 mg cap Take 1 capsule by mouth daily.       tamsulosin (FLOMAX) 0.4 mg cap Take 1 capsule by mouth daily.       No current facility-administered medications on file prior to encounter.        Allergies   Allergen Reactions     Memantine Unknown       Past Medical History:   Diagnosis Date     Alzheimer's dementia (H)      Hypertension         Review of Systems  ROS:  Specifically negative for balance changes, headache, dizziness, foot drop, fevers, chills, appetite changes, nausea/vomiting, unexplained weight loss. Otherwise 13 systems reviewed are negative. Please see the patient's intake questionnaire from today for details.    Reviewed Social, Family, Past Medical and Past Surgical history with patient, no significant changes noted since prior visit.     Objective:     There were no vitals taken for this visit.    PHYSICAL EXAMINATION:    --CONSTITUTIONAL: Well developed, well nourished, healthy appearing individual.  --PSYCHIATRIC: Appropriate mood and affect. No difficulty interacting due to temper, social withdrawal, or memory issues.  --SKIN: Lumbar region is dry and intact.   --RESPIRATORY: Normal rhythm and effort. No abnormal  accessory muscle breathing patterns noted.   --MUSCULOSKELETAL:  Normal lumbar lordosis noted, no lateral shift.  --LUMBAR SPINE: He describes pain in his low back.  --LOWER EXTREMITY MOTOR TESTING:  He had normal strength on his last physical exam in the clinic and denies any new weakness.  --NEUROLOGIC: He denies any new sensation changes had normal sensation on last physical exam in the clinic.    RESULTS:   Imaging: Lumbar spine imaging was reviewed today. The images were shown to the patient and the findings were explained using a spine model.    Phone call duration: 10 minutes    Javy Liu, DO

## 2021-06-14 NOTE — PATIENT INSTRUCTIONS - HE
I recommend you continue taking gabapentin 200 mg in the morning and 100 mg at bedtime.    ~Please call Nurse Navigation line [phone] with any questions or concerns about your treatment plan, if symptoms worsen and you would like to be seen urgently, or if you have problems controlling bladder and bowel function.

## 2021-06-15 NOTE — PROGRESS NOTES
Speech Language/Pathology  Videofluoroscopic Swallow Study     Problem:  There is no problem list on file for this patient.      Onset Date: 1/25/2021 (order date)  Reason for Evaluation: Concern for dysphagia  Pertinent History: Alzheimer's dementia  Current Diet: Regular textures and thin liquids    Assessment:    Patient demonstrated mild to moderate oral dysphagia and moderate pharyngeal dysphagia.    Patient is at mild to moderate aspiration risk with thin liquids.    Rehab potential is guarded based on prior level of function and evaluation results.    Due to decreased cognitive status, patient is not a good candidate to complete therapeutic exercises or learn compensatory swallow strategies to improve oral and/or pharyngeal phase of swallow.    Recommendations:    Plan: Continue current diet of Regular (soft) textures and thin liquids as tolerated    Strategies: Patient to sit fully upright for all intake, eat slowly, and take small bites and sips.     Speech Therapy is not recommended at this time    Referrals: If symptoms persist or worsen, consider esophagram. The fact that patient seems to do well with softer foods and smaller, more frequent meals is suggestive of possible esophageal dysmotility issue(s).  If patient experiences a decline in respiratory status and/or develops clinical signs and symptoms of pneumonia, consider repeat Video Swallow Study as aspiration would be a concern.    Reviewed history of swallow problem with patient & wife and verbally explained roles of SLP and radiologist. Verbally explained process of VFSS prior to administration of barium. Verbally explained results and recommendations to patient & wife. SLP answered questions and stated that a copy of this report will be faxed to patient's referring provider, Dr. Marc with [facility]. Wife verbalized understanding. Due to the severity of his cognitive impairment, patient appeared to have minimal to no  "understanding of the education provided.    Subjective:    Patient presented as pleasantly confused during this evaluation.  He was accompanied by his wife, Mattie, who explained that patient \"has been gagging and spitting up mucous when he eats\".  She estimates that this occurs 3-4 times a week.  Wife also states that patient tolerates oral intake well when she feeds him soft foods cut into small pieces and provides him with smaller, more frequent meals throughout the day as opposed to 3 large meals.      An  was not applicable    Patient was given honey-thick, pudding-thick, nectar-thick, and thin consistencies of barium.    Objective:    Dentition/Oral hygiene: Adequate    Oral motor function was grossly intact.     Bolus prep and oral control were mildly to moderately impaired.  Piecemeal deglutition was noted with pudding-thick consistency.     Anterior-Posterior transit was mildly impaired.    Premature spill beyond the base of the tongue into the valleculae occurred with honey-thick liquid and pudding-thick consistency.    Tongue base retraction was mildly impaired.    Mild oral stasis occurred with all consistencies. Between presentations of barium, this often pooled in the valleculae, prompting patient to complete a second, dry swallow.    Aspiration did not occur with any consistency.    Laryngeal penetration occurred with thin and nectar-thick liquid.  This was shallow to moderately deep, but transient with nectar.  With thin liquid, laryngeal penetration was generally transient, but deeper.  There was a single episode of penetration to the level of the vocal folds with a trace amount of thin liquid.  This did not clear spontaneously.     Due to poor cognitive status, swallow strategies were not trialed under fluoroscopy.    Swallow response was delayed with all consistencies. This resulted in pourover to the pyriform sinuses and into the laryngeal vestibule with thin and nectar-thick liquid.  " With honey-thick liquid, there was variable pourover to the pyriform sinuses.  With pudding-thick consistency, the swallow response occurred at the valleculae.    Epiglottic movement was complete consistently across texture trials, though delayed with thin and nectar-thick liquid.  This contributed to laryngeal penetration with these consistencies.    Mild pharyngeal stasis was noted in the valleculae following boluses of honey-thick liquid.  With nectar-thick and thin liquid, there was mild stasis in the valleculae and pyriform sinuses.  Pharyngeal stasis cleared with spontaneous dry swallows.    Pharyngeal constriction was mildly impaired.    Hyolaryngeal elevation was not impaired. Hyolaryngeal excursion was reduced.    Cricopharyngeal function was not impaired. Cervical esophageal function was not impaired.    27 dysphagia minutes    Hayley PARADISE Hogan, CCC-SLP

## 2021-06-16 PROBLEM — E55.9 VITAMIN D DEFICIENCY: Status: ACTIVE | Noted: 2021-01-01

## 2021-06-16 PROBLEM — C61 PROSTATE CANCER (H): Status: ACTIVE | Noted: 2021-01-01

## 2021-06-16 PROBLEM — E78.5 HYPERLIPIDEMIA: Status: ACTIVE | Noted: 2021-01-01

## 2021-06-16 PROBLEM — H26.9 CATARACT: Status: ACTIVE | Noted: 2021-01-01

## 2021-06-16 PROBLEM — F02.80 PRIMARY DEGENERATIVE DEMENTIA OF THE ALZHEIMER TYPE, SENILE ONSET, UNCOMPLICATED (H): Status: ACTIVE | Noted: 2021-01-01

## 2021-06-16 PROBLEM — G30.1 PRIMARY DEGENERATIVE DEMENTIA OF THE ALZHEIMER TYPE, SENILE ONSET, UNCOMPLICATED (H): Status: ACTIVE | Noted: 2021-01-01

## 2021-06-16 PROBLEM — N39.41 URGE INCONTINENCE OF URINE: Status: ACTIVE | Noted: 2021-01-01

## 2021-06-16 PROBLEM — M48.061 SPINAL STENOSIS OF LUMBAR REGION WITHOUT NEUROGENIC CLAUDICATION: Status: ACTIVE | Noted: 2021-01-01

## 2021-07-03 NOTE — ADDENDUM NOTE
Addendum Note by Swati Valencia CNP at 4/25/2019  2:00 PM     Author: Swati Valencia CNP Service: -- Author Type: Nurse Practitioner    Filed: 4/25/2019  2:39 PM Date of Service: 4/25/2019  2:00 PM Status: Signed    : Swati Valencia CNP (Nurse Practitioner)    Encounter addended by: Swati Valencia CNP on: 4/25/2019  2:39 PM      Actions taken: Order Reconciliation Section accessed

## 2021-07-04 NOTE — TELEPHONE ENCOUNTER
PSP:  Dr. Liu  Last clinic visit:  12/23/2020  Reason for call: Medication request  Clinical information:  Call received from pt's spouse, Lb. She reports the patient is now in home hospice care. She states the pt has been having some difficulties in swallowing his gabapentin. She inquires if a new prescription could be sent for this in liquid form. He currently takes gabapentin 200 mg in the AM and 100 mg in PM. [facility]. She states this dose has been effective for him. The pt has also been prescribed morphine PRN, but she states he doesn't need it yet at this point.   She also states she will be getting some OTC lidocaine patches for him to try.   Advice given to patient: Informed her that provider would be updated.   Provider to address: New gabapentin prescription needed - they are requesting liquid form

## 2021-07-04 NOTE — TELEPHONE ENCOUNTER
Telephone Encounter by Javy Liu DO at 6/29/2021  4:49 PM     Author: Javy Liu DO Service: -- Author Type: Physician    Filed: 6/29/2021  4:50 PM Encounter Date: 6/29/2021 Status: Signed    : Javy Liu DO (Physician)       I have sent a liquid form to the pharmacy.

## 2022-02-17 PROBLEM — M17.10 UNILATERAL PRIMARY OSTEOARTHRITIS, UNSPECIFIED KNEE: Status: ACTIVE | Noted: 2021-01-01
